# Patient Record
Sex: MALE | Race: WHITE | URBAN - METROPOLITAN AREA
[De-identification: names, ages, dates, MRNs, and addresses within clinical notes are randomized per-mention and may not be internally consistent; named-entity substitution may affect disease eponyms.]

---

## 2017-11-30 ENCOUNTER — ALLIED HEALTH/NURSE VISIT (OUTPATIENT)
Dept: NEUROLOGY | Facility: CLINIC | Age: 82
End: 2017-11-30

## 2017-11-30 ENCOUNTER — THERAPY VISIT (OUTPATIENT)
Dept: SPEECH THERAPY | Facility: CLINIC | Age: 82
End: 2017-11-30

## 2017-11-30 ENCOUNTER — RADIANT APPOINTMENT (OUTPATIENT)
Dept: CARDIOLOGY | Facility: CLINIC | Age: 82
End: 2017-11-30
Attending: PSYCHIATRY & NEUROLOGY

## 2017-11-30 ENCOUNTER — THERAPY VISIT (OUTPATIENT)
Dept: OCCUPATIONAL THERAPY | Facility: CLINIC | Age: 82
End: 2017-11-30

## 2017-11-30 ENCOUNTER — THERAPY VISIT (OUTPATIENT)
Dept: PHYSICAL THERAPY | Facility: CLINIC | Age: 82
End: 2017-11-30

## 2017-11-30 ENCOUNTER — OFFICE VISIT (OUTPATIENT)
Dept: NEUROLOGY | Facility: CLINIC | Age: 82
End: 2017-11-30

## 2017-11-30 VITALS
TEMPERATURE: 98.2 F | RESPIRATION RATE: 20 BRPM | HEART RATE: 62 BPM | OXYGEN SATURATION: 98 % | HEIGHT: 67 IN | WEIGHT: 153.4 LBS | SYSTOLIC BLOOD PRESSURE: 149 MMHG | BODY MASS INDEX: 24.08 KG/M2 | DIASTOLIC BLOOD PRESSURE: 67 MMHG

## 2017-11-30 DIAGNOSIS — R29.898 RIGHT LEG WEAKNESS: ICD-10-CM

## 2017-11-30 DIAGNOSIS — Z74.09 IMPAIRED MOBILITY AND ADLS: Primary | ICD-10-CM

## 2017-11-30 DIAGNOSIS — G12.1 KENNEDY'S DISEASE (H): ICD-10-CM

## 2017-11-30 DIAGNOSIS — G45.1 HEMISPHERIC CAROTID ARTERY SYNDROME: ICD-10-CM

## 2017-11-30 DIAGNOSIS — Z78.9 IMPAIRED MOBILITY AND ADLS: ICD-10-CM

## 2017-11-30 DIAGNOSIS — Z74.09 IMPAIRED MOBILITY: Primary | ICD-10-CM

## 2017-11-30 DIAGNOSIS — R29.5 TRANSIENT PARALYSIS: ICD-10-CM

## 2017-11-30 DIAGNOSIS — R13.12 OROPHARYNGEAL DYSPHAGIA: Primary | ICD-10-CM

## 2017-11-30 DIAGNOSIS — Z74.09 IMPAIRED MOBILITY AND ADLS: ICD-10-CM

## 2017-11-30 DIAGNOSIS — Z78.9 IMPAIRED MOBILITY AND ADLS: Primary | ICD-10-CM

## 2017-11-30 DIAGNOSIS — R79.9 ABNORMAL FINDING OF BLOOD CHEMISTRY: ICD-10-CM

## 2017-11-30 DIAGNOSIS — G12.1 KENNEDY'S DISEASE (H): Primary | ICD-10-CM

## 2017-11-30 DIAGNOSIS — R13.12 OROPHARYNGEAL DYSPHAGIA: ICD-10-CM

## 2017-11-30 DIAGNOSIS — C84.A0: Primary | ICD-10-CM

## 2017-11-30 RX ORDER — OXYBUTYNIN CHLORIDE 5 MG/1
5 TABLET ORAL 2 TIMES DAILY
COMMUNITY

## 2017-11-30 RX ORDER — BACTERIOSTATIC WATER 1 ML/ML
30 INJECTION, SOLUTION INTRAMUSCULAR; INTRAVENOUS; SUBCUTANEOUS ONCE
Status: COMPLETED | OUTPATIENT
Start: 2017-11-30 | End: 2017-11-30

## 2017-11-30 RX ORDER — TRIAMCINOLONE ACETONIDE 1 MG/G
1 OINTMENT TOPICAL DAILY
COMMUNITY
Start: 2015-07-17

## 2017-11-30 RX ORDER — CLOBETASOL PROPIONATE 0.5 MG/G
1 OINTMENT TOPICAL DAILY
COMMUNITY
Start: 2015-07-14

## 2017-11-30 RX ADMIN — Medication 3 ML: at 14:30

## 2017-11-30 RX ADMIN — BACTERIOSTATIC WATER 10 ML: 1 INJECTION, SOLUTION INTRAMUSCULAR; INTRAVENOUS; SUBCUTANEOUS at 14:28

## 2017-11-30 ASSESSMENT — REVISED AMYOTROPHIC LATERAL SCLEROSIS FUNCTIONAL RATING SCALE (ALSFRS-R)
DYSPNEA: 4
DRESSING_AND_HYGEINE: 2
SWALLOWING: EARLY EATING PROBLEMS - OCCASIONAL CHOKING
RESPIRATORY_SUBTOTAL_SCORE: 12
ALSFRS_TOTAL_SCORE: 39
SWALLOWING: 3
SIX_ITEM_SUBTOTAL: 19
CLIMBING_STAIRS: 1
HANDWRITING: 3
WALKING: EARLY AMBULATION DIFFICULTIES
CLIMBING_STAIRS: NEEDS ASSISTANCE
FINE_MOTOR_SUBTOTAL_SCORE: 9
SALIVATION: 4
SPEECH: DETECTABLE SPEECH DISTURBANCES
GROSS_MOTOR_SUBTOTAL_SCORE: 8
DRESSING_AND_HYGEINE: INTERMITTENT ASSISTANCE OR SUBSTITUTE METHODS
TURNING_IN_BED_AND_ADJUSTING_BED_CLOTHES: NORMAL
ORTHOPENA: 4
CUTTING_FOOD_AND_HANDLING_UTENSILES: 4
BULBAR_SUBTOTAL: 10
TURNING_IN_BED_AND_ADJUSTING_BED_CLOTHES: 4
RESPIRATORY_INSUFFICIENCY: 4
WALKING: 3
SPEECH: 3
SALIVATION: NORMAL
HANDWRITING: SLOW OR SLOPPY: ALL WORDS ARE LEGIBLE

## 2017-11-30 ASSESSMENT — ENCOUNTER SYMPTOMS
JOINT SWELLING: 0
DYSURIA: 1
HEMATURIA: 0
ARTHRALGIAS: 0

## 2017-11-30 ASSESSMENT — PAIN SCALES - GENERAL: PAINLEVEL: NO PAIN (1)

## 2017-11-30 NOTE — PROGRESS NOTES
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    NUTRITION HISTORY  Factors affecting nutrition intake include:swallowing difficulties  Current diet: general   Current appetite/intake: good  PEG Tube: No    Kuldeep avoids rice and quinoa d/t difficulty swallowing. Denies difficulty with any other foods.     Diet Recall  Breakfast Cereal with 1% milk   Lunch Hawaiian ham/cheese sandwich with can of cream soda (diet or regular)   Dinner Variety of meat and vegetables, potatoes 1 can cream soda   Snacks Ice cream, pie, cookies, nuts   Beverages Water, 2 cream sodas/day   Alcohol No       ANTHROPOMETRICS  Height: 66kg  Weight: 153 lbs/70kg  BMI: 24.6  Weight Status:  Normal BMI  IBW: 140 lbs  % IBW: 109%  Weight History: stable   Wt Readings from Last 5 Encounters:   11/30/17 69.6 kg (153 lb 6.4 oz)   05/08/14 68.5 kg (151 lb)   05/23/13 72.1 kg (159 lb)   09/20/12 69.4 kg (153 lb)   07/13/12 67.8 kg (149 lb 8 oz)     ASSESSED NUTRITION NEEDS:  Estimated Energy Needs: 4329-2132 kcals (25-30 Kcal/Kg)  Justification: maintenance  Estimated Protein Needs: 84 grams protein (1.2 g pro/Kg)  Justification: maintenance  Estimated Fluid Needs: 2000  mL   Justification: maintenance    MALNUTRITION:  % Weight Loss:  None noted  % Intake:  No decreased intake noted  Subcutaneous Fat Loss:  None observed  Muscle Loss:  None observed  Fluid Retention:  None noted    Malnutrition Diagnosis: Patient does not meet two of the above criteria necessary for diagnosing malnutrition but is at risk    NUTRITION DIAGNOSIS:  Predicted suboptimal nutrient intake related to related to Be's disease, dysphagia     INTERVENTIONS  Recommendations / Nutrition Prescription  1. 2100-2400kcal/day via small frequent meals  Nutrition Education    Provided written & verbal education on:    - Ways to maximize kcal and protein intake. Discussed calorie and protein needs for maintenance of weight and nutrition status.  Advised pt to aim for at least 2100kcal and 80g protein via 5-6 small frequent meals.   - Coping with barriers - as chemotherapy/radiation progresses, eating may become more difficult and discussed ways to cope with this.  - ONS (Ensure Plus/Boost Plus, CIB, Benecalorie, Scandi shake etc). Suggested ways to incorporate these supplements to avoid flavor fatigue. Encouraged pt to start consuming 1-2 ONS daily if weight starts to decline      Pt verbalize understanding of materials provided during consult.   Patient Understanding: Excellent  Expected Compliance: Excellent     Goals  1.  Aim for 5-6 small frequent meals  2.  Aim for 2100kcal and 80g protein/day  3. Weight maintenance       Follow-Up Plans: Pt has RD contact information for questions.      MONITORING AND EVALUATION:  -Food intake  -Weight trends    Ksenia Garcia RD, LD

## 2017-11-30 NOTE — PATIENT INSTRUCTIONS
"Dr Gonzalez has ordered an MRI/MRA for you today.  You will also need an EKG/Echo today.    You will need to set up a lab appointment. It is a \"fasting lab\" meaning you need to have gone 8 hrs w/out eating before your lab appt.    yr follow up with Dr Gonzalez.  "

## 2017-11-30 NOTE — PROGRESS NOTES
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    Others at Clinic Visit:  Alone    Living Situation:   With others  Daughter    Patient Concerns/Goals:  Increased swallowing difficulty  Increased speech deficits    Observations: Patient pleasant and cooperative. He reports increased difficulty swallowing, specifically with large sips of liquids and foods such as rice, nuts, and corn.    Current Mode of Nutrition:   Oral diet    Weight: 153lbs    Cardio-Respiratory Status: Forced vital capacity: 101%    Functional Rating Scale (ALS-FRS): 39/48    Oral Motor/Swallowing  Oral Motor Function:  Anomalies present:  Labial anomaly- mildly reduced strength and ROM, right greater than left  Velar elevation- hypernasal suggests some deficits, palate appears minorly assymetrical at rest but elevates symetrically    Volitional Abilities:  Cough- Functional  Throat Clear- Functional  Swallow- Present    Feeding Assistance: No assistance needed     EAT10: 6 with 1 point for each of the following; extra effort with liquids, extra effort with pills, pleasure of eating is affected, food sticks in throat, coughing with eating, swallowing is stressful.    Swallow Function:   Thin Liquid-  mildly delayed swallow initiation. No overt oral dysphagia or overt s/s aspiration  Solid-  mildly delayed swallow initiation and reduced laryngeal elevation bu adequate oral phase.    Dysphagia Diagnosis: Mild dysphagia    Speech Intelligibility/Functional Communication   Methods of communication: Verbal    Dysarthria: Minimal    Speech:   Deficits in resonance- Hypernasal    Speech Intelligibility/Communication:  Communicates  functionally    Augmentative and Alternative Communication (AAC):   Not indicated    Clinical Impression/Plan of Care  Treatment Diagnosis: Oropharyngeal Dysphagia     Recommendations:  Oral diet.  Soft, moist solid with no particulate.  Continue use of compensatory strategies.    Plan of Care:  1 session evaluation and treatment.    Goals: Based on today's evaluation session patient and/or caregiver will have understanding of current communication and/or swallowing status and recommendations for management.    Educational Assessment:  Learners- Patient  Barriers to Learning- No barriers.    Education provided/response:   Speech  Swallowing  Diet  Verbalized understanding    Treatment provided this date:   Swallow intervention, 20 minutes minutes    Response to recommendations/treatment: SLP provided education re swallowing, swallowing anatomy and physiology, aspiration risks, diet modifications and compensatory strategies. Recommend he continue regular solids and thin liquids but with close monitoring for tolerance and encouraged soft, moist solids. Provided education and handout re NDD3 for ideas. Encouraged alternating consistencies, small bites/sips, slow rate, chin tuck, and avoiding straws. Pt also reports some difficulty with pills, educated re pills in pureed. Educated re possibility of Video Swallow Study to more clearly assess. He wishes to trial eating with these recommendations and if ongoing issues are noted he will seek VFSS.     Goal attainment: All goals met    Risks and benefits of evaluation/treatment have been explained.  Patient, family and/or caregiver are in agreement with Plan of Care.    Timed Code Treatment Minutes: 0 minutes    Total Treatment Time (sum of timed and untimed services): 35 minutes    Medicare G-code:  Swallowing  Swallowing: Current Status , Goal , Discharge   1 session only, modifier the same for all G-codes.  CI: 1-19% impairment  Modifier determined by  clinical judgment in conjunction with objective data and subjective report.

## 2017-11-30 NOTE — LETTER
"11/30/2017       RE: Paul Allen Franke  [address]  [place] [address]     Dear Colleague,    Thank you for referring your patient, Paul Allen Franke, to the [facility] NEUROLOGY at [facility]. Please see a copy of my visit note below.    NEUROLOGY  CLINIC      History of Present Illness:  Mr. 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  He was last seen in May of 2014.  At that time he was continuing to work full time at [employer] despite some fatigue, having cramps that were well controlled with gabapentin, and occasional dysphagia with stable weakness.      He notes progressive worsening of symptoms since then.  In Feb 2017 he was moving boxes and trying to lift the boxes and felt an \"electrical charge\" in right quad and sudden onset of leg weakness leading to a fall.  Had onset of back pain at that same time as well. Tried to continue working and fell an additional 5 times.  Injured left foot during one of the falls and was seen by PCP and had negative XRs at that time.  Did not have any imaging of back or brain at that time.  Feels like leg strength has been significantly worse since then, primarily on that side but feels like left leg gets more fatigued as well due to compensating.  Continues to have some back pain as well. Can walk normally in AM but right leg gets tired more easily.  Denies numbness or tingling.  Right leg feels \"clumsy\" and \"heavy\".  No radicular pain down the leg. No falls besides the one in Feb 2017 but knows that he needs to be cautious with how he moves.  Arms have also gotten weaker over time which he mostly notices when lifting 35+ lbs of food at work but is able to lift up to 30 lbs without problems.    Continues to work 40 hours a week at [employer].  Not interested in cutting back on hours at this point out of \"economic necessity\".  Continues to have cramps in legs, primarily " "at night, that respond well to stretching and gabapentin.  Feels happy with control of cramps at this point.  Dysphagia has progressed a little bit- has trouble with rice in particular now and occasionally liquids.  He is chewing foods slowly and carefully and has coughing/choking episodes about once a week if he takes his time.  Not thickening liquids.   Has not lost weight.  Is wondering if handicap parking sign is appropriate and what prognosis is.        Current Medications:  Current Outpatient Prescriptions   Medication Sig Dispense Refill     gabapentin (NEURONTIN) 100 MG capsule Take 2 capsules (200 mg) by mouth 3 times daily 180 capsule 5     diflorasone (PSORCON) 0.05 % ointment Apply to affected area(s) twice daily 180 g 0     tamsulosin (FLOMAX) 0.4 MG 24 hr capsule Take by mouth daily       lisinopril (PRINIVIL,ZESTRIL) 2.5 MG tablet Take 2.5 mg by mouth daily.       aspirin 81 MG tablet Take 1 tablet by mouth daily.       lovastatin (MEVACOR) 40 MG tablet Take 2 tablets by mouth At Bedtime.       nitroGLYCERIN (NITROSTAT) 0.4 MG SL tablet Place 0.4 mg under the tongue every 5 minutes as needed.       metoprolol (TOPROL XL) 50 MG 24 hr tablet Take 50 mg by mouth daily.         Physical Exam:  Blood pressure 149/67, pulse 62, temperature 98.2  F (36.8  C), temperature source Oral, resp. rate 20, height 1.689 m (5' 6.5\"), weight 69.6 kg (153 lb 6.4 oz), SpO2 98 %.   He has normal extraocular muscles, eyelid closure is weaker on the right compared to left.  He has moderate to severe weakness of lip closure on the right side and mild to moderate on the left.  It is asymmetric.  Tongue shows mild fasciculations and modest atrophy.  Jaw opening and closure is mildly weak.   Deltoid  strength is 5 bilaterally, biceps are 4+ bilaterally, triceps 4 bilaterally.  Wrist extension is 5, bilateral FDIs 4, APB 5 on the right and 4 on the left,  5 on the right and 4 on the left.   Hip flexion is 4+ bilaterally, " quads are 5, hamstrings 4 on the right and 4+ on the left, foot dorsiflexion 5, and hip adductors are 4+ on the right and 5- on the left.  Tone is mildly increased on the right leg with a spastic catch. Gait shows circumduction gait with right foot drop.  Reflexes are 0 at bilateral patella and ankles. Plantar responses were bilaterally neutral. Sensory exam shows equal perception of light touch and pinprick in both legs without obvious asymmetry.       Assessment and Plan:  Patient is a 83 yo male with a history of genetically confirmed Be's diseases who presents for follow-up.  In terms of his Be's disease, his strength is overall stable compared to 2014.  Has had some progression of dysphagia and we will have him see SLP for potential diet modification and PT today.    The more pressing issue, however, is the acute change in his right leg strength and gait that occurred in Feb 2017.  This sudden change cannot be explained by his Be's disease.  Given his circumducted gait pattern and increased tone on exam, I am most concerned about the possibility of an ischemic stroke.  We will have him undergo stroke work-up, including MRI/MRA of the brain and neck, fasting lipids, A1c, EKG, and Echo.  He was instructed to continue his daily aspirin.  If stroke work-up is negative, then will consider imaging the lumbar spine, but radiculopathy is less likely given clinical exam.    Return to clinic in 1 year.    Sharri Becker  PM&R resident    Patient seen with Dr. Gonzalez.    ATTENDING ADDENDUM: Patient seen and examined today with resident Dr Becker at the [facility]. I agree with her assessment and recs as above. 40 minutes spent in direct patient interaction, of which greater than 50% in counseling and coordination of care. Alban Gonzalez MD      Again, thank you for allowing me to participate in the care of your patient.       Sincerely,    Alban Gonzalez MD

## 2017-11-30 NOTE — PROGRESS NOTES
"    OUTPATIENT PHYSICAL THERAPY CLINIC NOTE  Franke,Paul Allen  YOB: 1935  [id number]    Type of visit:         Evaluation     Date of service: 11/30/2017    Referring provider: Dr. Gonzalez    Others present at visit:  none    Medical diagnosis:   Be's disease    Date of diagnosis: 1994    Pertinent history of current problem (include personal factors and/or comorbidities that impact the plan of care): Gradual weakness over many years but recent onset of R LE incoordination with 6 falls in Feb 2017.    Cardio-respiratory status:  Forced vital capacity: 101 % of predicted     Height/Weight: 5'6.5\" / 153 lb    Living environment:  Apartment with daughter    Living environment barriers:  1 stairs to enter (no railing present), to sidewalk out front, able to hold onto stone work      Current assistance/living environment:  Lives with daughter (she doesn't work, on disability)      Current mobility equipment:  Standard cane(s)- has in home from daughter's prior use, but not using     Current ADL equipment:  Tub/shower combo  Shower chair  Wall grab bar    Technology used: NT    Patient concerns/goals: Had a spell in February with R LE not working well, has \"lost mobility\" with R LE. Didn't go in to get checked out. Working full time in grocery store, walks better with support of cart.     Evaluation   Interview completed.   Pain assessment: Low back when walking today- woke with back hurting today for first time in very long time. 7-8/10. Thinks it's due to compensating d/t R LE issue.    Range of motion: Stiffness HS R>L   Manual muscle testing: Hip flexion 4+/5 R, 5/5 L, hip abd 4+/5 R, 5/5 L, hip add 5/5 B, knee ext 4+/5 R, 5/5 L, knee flex 5/5 B, ankle DF 5/5 B   Gait: Pt amb without AD with waddling gait, significant torso lean to R in R stance with low back pain.    Cognition: Intact     *ALS FRS-R (ALS Functional Rating Scale-Revised) completed today. Please see separate note. Total score: " 39    Fall Risk Screen:   Has the patient fallen 2 or more times in the last year? Yes- fell 6 times in February.       Has the patient fallen and had an injury in the past year? Yes, fell onto L foot, very swollen 3 weeks       Timed Up and Go Score: 12.59 sec, no AD     25ft walk: No AD: 12.06 sec, 17 steps; 4ww trial: 12.88 sec, 18 steps (new device)    Is the patient a fall risk? Yes, department fall risk interventions implemented     Impairments:  Fatigue  Coordination  Balance  Pain  Range of motion     Treatment diagnosis:  Impaired mobility  Impaired activities of daily living    Clinical Presentation: Evolving/Changing  Clinical Presentation Rationale: Gradually progressive nature of Be's with recent onset of R LE incoordination- possible CVA, will undergo further workup.  Clinical Decision Making (Complexity): Moderate complexity     Recommendations/Plan of care:  1 session evaluation & treatment.  Equipment recommended: Cane- has available will check height, and consider 4ww- would like to think about this.  Referrals recommended: Encouraged pt consider OP PT to address low back pain and safe mobility. Pt would like to await further medical workup and consider this later.      Goals:   Target date: 11/30/17  Patient, family and/or caregiver will verbalize understanding of evaluation results and implications for functional performance.  Patient, family and/or caregiver will verbalize/demonstrate understanding of compensatory methods /equipment to enhance functional independence and safety.    Educational assessment/barriers to learning:   No barriers noted     Treatment provided this date:   Gait training, 15 minutes  -Trial of 4ww and SEC, with pt open to use of SEC but not ready to consider 4ww at this time, per report. Pt did note liking support of 4ww similar to shopping cart, but more open to the idea of a cane. Education provided for use and sequence of cane, encouraged use in L hand/opposite of  MAURY NAVARRO. This did reduce the torso lean and was more stable than without AD. I did give pt feedback that I preferred the support of the 4ww with notable improvement in stability, pt voiced understanding and will think about it. Also discussed benefits of seat for available rest. Pt open to use of cane, has cane at home. Education for how to size the cane for his height.     Response to treatment/recommendations: Verbalizes understanding    Goal attainment:  All goals met     Risks and benefits of evaluation/treatment have been explained.  Patient, family and/or caregiver are in agreement with Plan of Care.     Timed Code Treatment Minutes: 15  Total Treatment Time (sum of timed and untimed services): 31    Signature:   Thais Soliz, PT, DPT  Physical Therapist  [facility]  Outpatient Rehabilitation Services, [address]  [place] [address]  [email or web address]  Office: [phone]  Pager: [phone]  Fax: [phone]   Date: 11/30/2017    Medicare G-code:  Mobility: Walking and Moving Around  Current Status , Goal , Discharge   1 session only, modifier the same for all G-codes.  CK: 40-59% impairment  Modifier determined by clinical judgment in conjunction with objective data and subjective report.

## 2017-11-30 NOTE — MR AVS SNAPSHOT
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 Please bring or wear a comfortable two-piece outfit. 2. You may eat, drink and take your normal medicines. 3. For any questions that cannot be answered, please contact the ordering physician            Dec 12, 2017 10:30 AM CST   LAB with  LAB   [facility] ([facility])    [address] [place] [id number] [phone]           Please do not eat 10-12 hours before your appointment if you are coming in fasting for labs on lipids, cholesterol, or glucose (sugar). This does not apply to pregnant women. Water, hot tea and black coffee (with nothing added) are okay. Do not drink other fluids, diet soda or chew gum.            Dec 12, 2017 10:45 AM CST   (Arrive by 10:30 AM)   MR BRAIN FOR STROKE COMPLETE with [address] [facility] MRI ([facility])    [address] [place] [id number] [phone]           Take your medicines as usual, unless your doctor tells you not to. Bring a list of your current medicines to your exam (including vitamins, minerals and over-the-counter drugs). Also bring the results of similar scans you may have had.  Please remove any body piercings and hair extensions before you arrive.  Follow your doctor s orders. If you do  not, we may have to postpone your exam.  You will not have contrast for this exam. You do not need to do anything special to prepare.  The MRI machine uses a strong magnet. Please wear clothes without metal (snaps, zippers). A sweatsuit works well, or we may give you a hospital gown.   **IMPORTANT** THE INSTRUCTIONS BELOW ARE ONLY FOR THOSE PATIENTS WHO HAVE BEEN TOLD THEY WILL RECEIVE SEDATION OR GENERAL ANESTHESIA DURING THEIR MRI PROCEDURE:  IF YOU WILL RECEIVE SEDATION (take medicine to help you relax during your exam):   You must get the medicine from your doctor before you arrive. Bring the medicine to the exam. Do not take it at home.   Arrive one hour early. Bring someone who can take you home after the test. Your medicine will make you sleepy. After the exam, you may not drive, take a bus or take a taxi by yourself.   No eating 8 hours before your exam. You may have clear liquids up until 4 hours before your exam. (Clear liquids include water, clear tea, black coffee and fruit juice without pulp.)  IF YOU WILL RECEIVE ANESTHESIA (be asleep for your exam):   Arrive 1 1/2 hours early. Bring someone who can take you home after the test. You may not drive, take a bus or take a taxi by yourself.   No eating 8 hours before your exam. You may have clear liquids up until 4 hours before your exam. (Clear liquids include water, clear tea, black coffee and fruit juice without pulp.)   You will spend four to five hours in the recovery room.  Please call the Imaging Department at your exam site with any questions.            Nov 15, 2018  9:30 AM CST   PFT VISIT with  PFL EDINSON   [facility] Pulmonary Function Testing ([facility])    [address] [place] [id number] [phone]            Nov 15, 2018 10:00 AM CST   (Arrive by 9:45 AM)   New ALS/Motor Neuron with Alban Gonzalez MD   [facility] Neurology ([facility])    [address] [place] [address]  Street   [address] [facility] [address] [phone]              Future tests that were ordered for you today     Open Future Orders        Priority Expected Expires Ordered    EKG 12-lead complete w/read - Clinics Routine  2018    Lipid panel Routine  2018    Hemoglobin A1c Routine  2018    Echocardiogram Routine  2018    MRI Brain for stroke complete Routine  2018            Who to contact     Please call your clinic at [phone] to:    Ask questions about your health    Make or cancel appointments    Discuss your medicines    Learn about your test results    Speak to your doctor   If you have compliments or concerns about an experience at your clinic, or if you wish to file a complaint, please contact [facility] Patient Relations at [phone] or email us at [email or web address]         Additional Information About Your Visit        [email or web address] Information     YourTeamOnline is an electronic gateway that provides easy, online access to your medical records. With YourTeamOnline, you can request a clinic appointment, read your test results, renew a prescription or communicate with your care team.     To sign up for YourTeamOnline visit the website at [email or web address]   You will be asked to enter the access code listed below, as well as some personal information. Please follow the directions to create your username and password.     Your access code is: [email or web address]  Expires: 2018  6:31 AM     Your access code will  in 90 days. If you need help or a new code, please contact your [facility] or call [phone] for assistance.        Care EveryWhere ID     This is your Care EveryWhere ID. This could be used by other organizations to access your [place] medical records  [id number]         Blood Pressure from Last 3 Encounters:   17 149/67    05/08/14 123/53   05/23/13 125/74    Weight from Last 3 Encounters:   11/30/17 69.6 kg (153 lb 6.4 oz)   05/08/14 68.5 kg (151 lb)   05/23/13 72.1 kg (159 lb)              We Performed the Following     PHYSICAL THERAPY REFERRAL        Primary Care Provider Office Phone # Fax #    Casper Ortega [phone]        [facility] [address] [facility] [address]        Equal Access to Services     ALEJANDRA ROMERO : Hadii aad ku hadasho Soomaali, waaxda luqadaha, qaybta kaalmada adeegyada, waxay idiin hayaan yossi huerta . So [facility] [phone].    ATENCIÓN: Si habla español, tiene a silva disposición servicios gratuitos de asistencia lingüística. [facility] [phone].    We comply with applicable federal civil rights laws and [place] laws. We do not discriminate on the basis of race, color, national origin, age, disability, sex, sexual orientation, or gender identity.            Thank you!     Thank you for choosing [facility] PHYSICAL THERAPY AND REHAB  for your care. Our goal is always to provide you with excellent care. Hearing back from our patients is one way we can continue to improve our services. Please take a few minutes to complete the written survey that you may receive in the mail after your visit with us. Thank you!             Your Updated Medication List - Protect others around you: Learn how to safely use, store and throw away your medicines at www.disposemymeds.org.          This list is accurate as of: 11/30/17  1:07 PM.  Always use your most recent med list.                   Brand Name Dispense Instructions for use Diagnosis    aspirin 81 MG tablet      Take 1 tablet by mouth daily.    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        lovastatin 40 MG tablet    MEVACOR     Take 2 tablets by mouth At Bedtime.    Cutaneous T-cell lymphoma (H)       nitroGLYcerin 0.4 MG sublingual tablet    NITROSTAT     Place 0.4 mg under the tongue every 5 minutes as needed.    Cutaneous T-cell lymphoma (H)       oxybutynin 5 MG tablet    DITROPAN     Take 5 mg by mouth 2 times daily        tamsulosin 0.4 MG capsule    FLOMAX     Take by mouth daily        TOPROL XL 50 MG 24 hr tablet   Generic drug:  metoprolol      Take 50 mg by mouth daily.    Cutaneous T-cell lymphoma (H)       triamcinolone 0.1 % ointment    KENALOG     Apply 1 Application topically daily

## 2017-11-30 NOTE — MR AVS SNAPSHOT
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)    [address] [place] [id number] [phone]           Please do not eat 10-12 hours before your appointment if you are coming in fasting for labs on lipids, cholesterol, or glucose (sugar). This does not apply to pregnant women. Water, hot tea and black coffee (with nothing added) are okay. Do not drink other fluids, diet soda or chew gum.            Dec 22, 2017  1:45 PM CST   (Arrive by 1:30 PM)   MR BRAIN FOR STROKE COMPLETE with [address] [facility] ([facility])    [address] [place] [id number] [phone]           Take your medicines as usual, unless your doctor tells you not to. Bring a list of your current medicines to your exam (including vitamins, minerals and over-the-counter drugs). Also bring the results of similar scans you may have had.  Please remove any body piercings and hair extensions before you arrive.  Follow your doctor s orders. If you do not, we may have to postpone your exam.  You will not have contrast for this exam. You do not need to do anything special to prepare.  The MRI machine uses a strong magnet. Please wear clothes without metal (snaps, zippers). A sweatsuit works well, or we may give you a hospital gown.   **IMPORTANT** THE INSTRUCTIONS BELOW ARE ONLY FOR THOSE PATIENTS WHO HAVE BEEN TOLD THEY  WILL RECEIVE SEDATION OR GENERAL ANESTHESIA DURING THEIR MRI PROCEDURE:  IF YOU WILL RECEIVE SEDATION (take medicine to help you relax during your exam):   You must get the medicine from your doctor before you arrive. Bring the medicine to the exam. Do not take it at home.   Arrive one hour early. Bring someone who can take you home after the test. Your medicine will make you sleepy. After the exam, you may not drive, take a bus or take a taxi by yourself.   No eating 8 hours before your exam. You may have clear liquids up until 4 hours before your exam. (Clear liquids include water, clear tea, black coffee and fruit juice without pulp.)  IF YOU WILL RECEIVE ANESTHESIA (be asleep for your exam):   Arrive 1 1/2 hours early. Bring someone who can take you home after the test. You may not drive, take a bus or take a taxi by yourself.   No eating 8 hours before your exam. You may have clear liquids up until 4 hours before your exam. (Clear liquids include water, clear tea, black coffee and fruit juice without pulp.)   You will spend four to five hours in the recovery room.  Please call the Imaging Department at your exam site with any questions.            Nov 15, 2018  9:30 AM CST   PFT VISIT with  PFL B   [facility] Pulmonary Function Testing ([facility])    [address] [place] [address] [phone]            Nov 15, 2018 10:00 AM CST   (Arrive by 9:45 AM)   New ALS/Motor Neuron with Alban Gonzalez MD   [facility] Neurology ([facility])    [address] [place] [address] [phone]              Who to contact     Please call your clinic at [phone] to:    Ask questions about your health    Make or cancel appointments    Discuss your medicines    Learn about your test results    Speak to your doctor   If you have compliments or concerns about an experience at your clinic, or if you wish to  file a complaint, please contact [facility] Patient Relations at [phone] or email us at [email or web address]         Additional Information About Your Visit        [employer] Information     T[employer] Skoops is an electronic gateway that provides easy, online access to your medical records. With [employer] Sko[employer], you can request a clinic appointment, read your test results, renew a prescription or communicate with your care team.     To sign up for [employer] Skoops visit the website at [email or web address]   You will be asked to enter the access code listed below, as well as some personal information. Please follow the directions to create your username and password.     Your access code is: [email or web address]  Expires: 2018  6:31 AM     Your access code will  in 90 days. If you need help or a new code, please contact your [facility] or call [phone] for assistance.        Care EveryWhere ID     This is your Care EveryWhere ID. This could be used by other organizations to access your [place] medical records  [id number]         Blood Pressure from Last 3 Encounters:   17 149/67   14 123/53   13 125/74    Weight from Last 3 Encounters:   17 69.6 kg (153 lb 6.4 oz)   14 68.5 kg (151 lb)   13 72.1 kg (159 lb)              We Performed the Following     OCCUPATIONAL THERAPY REFERRAL        Primary Care Provider Office Phone # Fax #    Casper LORENZANA Jordan [phone]        [facility] [address] [facility] [address]        Equal Access to Services     ALEJANDRA ROMERO : Hadii aad ku hadasho Soomaali, waaxda luqadaha, qaybta kaalmada adeegyada, matias curry. So [facility] [phone].    ATENCIÓN: Si habla español, tiene a silva disposición servicios gratuitos de asistencia lingüística. Llame al [phone].    We comply with applicable federal civil rights laws and [place] laws. We  do not discriminate on the basis of race, color, national origin, age, disability, sex, sexual orientation, or gender identity.            Thank you!     Thank you for choosing [facility] OCCUPATIONAL THERAPY AND REHAB  for your care. Our goal is always to provide you with excellent care. Hearing back from our patients is one way we can continue to improve our services. Please take a few minutes to complete the written survey that you may receive in the mail after your visit with us. Thank you!             Your Updated Medication List - Protect others around you: Learn how to safely use, store and throw away your medicines at www.disposemymeds.org.          This list is accurate as of: 11/30/17 11:59 PM.  Always use your most recent med list.                   Brand Name Dispense Instructions for use Diagnosis    aspirin 81 MG tablet      Take 1 tablet by mouth daily.    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        lovastatin 40 MG tablet    MEVACOR     Take 2 tablets by mouth At Bedtime.    Cutaneous T-cell lymphoma (H)       nitroGLYcerin 0.4 MG sublingual tablet    NITROSTAT     Place 0.4 mg under the tongue every 5 minutes as needed.    Cutaneous T-cell lymphoma (H)       oxybutynin 5 MG tablet    DITROPAN     Take 5 mg by mouth 2 times daily        tamsulosin 0.4 MG capsule    FLOMAX     Take by mouth daily        TOPROL XL 50 MG 24 hr tablet   Generic drug:  metoprolol      Take 50 mg by mouth daily.    Cutaneous T-cell lymphoma (H)       triamcinolone 0.1 % ointment    KENALOG      Apply 1 Application topically daily

## 2017-11-30 NOTE — MR AVS SNAPSHOT
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  You will also need an EKG/Echo today.    You will need to set up a lab appointment. It is a \"fasting lab\" meaning you need to have gone 8 hrs w/out eating before your lab appt.    yr follow up with Dr Gonzalez.          Follow-ups after your visit        Additional Services     OCCUPATIONAL THERAPY REFERRAL       OT Clinician to evaluate and treat patient in ALS Clinic.            PHYSICAL THERAPY REFERRAL       PT Clinician to evaluate and treat patient in ALS Clinic.            SPEECH THERAPY REFERRAL       Speech Language Pathologist to evaluate and treat patient in ALS Clinic.                  Your next 10 appointments already scheduled     Nov 30, 2017  2:00 PM CST   Ech Complete with UCECHCR4   [facility] Echo ([facility])    [address] [place] [address] [phone]           1. Please bring or wear a comfortable two-piece outfit. 2. You may eat, drink and take your normal medicines. 3. For any questions that cannot be answered, please contact the ordering physician            Dec 12, 2017 10:30 AM CST   LAB with [facility] ([facility])    [address] [place] [address] [phone]           Please do not eat 10-12 hours before your appointment if you are coming in fasting for labs on lipids, " cholesterol, or glucose (sugar). This does not apply to pregnant women. Water, hot tea and black coffee (with nothing added) are okay. Do not drink other fluids, diet soda or chew gum.            Dec 12, 2017 10:45 AM CST   (Arrive by 10:30 AM)   MR BRAIN FOR STROKE COMPLETE with UC1   [facility] MRI ([facility])    [address]  [facility] [address] [phone]           Take your medicines as usual, unless your doctor tells you not to. Bring a list of your current medicines to your exam (including vitamins, minerals and over-the-counter drugs). Also bring the results of similar scans you may have had.  Please remove any body piercings and hair extensions before you arrive.  Follow your doctor s orders. If you do not, we may have to postpone your exam.  You will not have contrast for this exam. You do not need to do anything special to prepare.  The MRI machine uses a strong magnet. Please wear clothes without metal (snaps, zippers). A sweatsuit works well, or we may give you a hospital gown.   **IMPORTANT** THE INSTRUCTIONS BELOW ARE ONLY FOR THOSE PATIENTS WHO HAVE BEEN TOLD THEY WILL RECEIVE SEDATION OR GENERAL ANESTHESIA DURING THEIR MRI PROCEDURE:  IF YOU WILL RECEIVE SEDATION (take medicine to help you relax during your exam):   You must get the medicine from your doctor before you arrive. Bring the medicine to the exam. Do not take it at home.   Arrive one hour early. Bring someone who can take you home after the test. Your medicine will make you sleepy. After the exam, you may not drive, take a bus or take a taxi by yourself.   No eating 8 hours before your exam. You may have clear liquids up until 4 hours before your exam. (Clear liquids include water, clear tea, black coffee and fruit juice without pulp.)  IF YOU WILL RECEIVE ANESTHESIA (be asleep for your exam):   Arrive 1 1/2 hours early. Bring someone who can take you home after the  test. You may not drive, take a bus or take a taxi by yourself.   No eating 8 hours before your exam. You may have clear liquids up until 4 hours before your exam. (Clear liquids include water, clear tea, black coffee and fruit juice without pulp.)   You will spend four to five hours in the recovery room.  Please call the Imaging Department at your exam site with any questions.            Nov 15, 2018  9:30 AM CST   PFT VISIT with  IDALIA NEVES   [facility] Pulmonary Function Testing ([facility])    [address] [place] [address] [phone]            Nov 15, 2018 10:00 AM CST   (Arrive by 9:45 AM)   New ALS/Motor Neuron with Alban Gonzalez MD   [facility] Neurology ([facility])    [address] [place] [address] [phone]              Future tests that were ordered for you today     Open Future Orders        Priority Expected Expires Ordered    EKG 12-lead complete w/read - Clinics Routine  11/30/2018 11/30/2017    Lipid panel Routine  11/30/2018 11/30/2017    Hemoglobin A1c Routine  11/30/2018 11/30/2017    MRI Brain for stroke complete Routine  11/30/2018 11/30/2017            Who to contact     Please call your clinic at [phone] to:    Ask questions about your health    Make or cancel appointments    Discuss your medicines    Learn about your test results    Speak to your doctor   If you have compliments or concerns about an experience at your clinic, or if you wish to file a complaint, please contact [facility] Patient Relations at [phone] or email us at [email or web address]         Additional Information About Your Visit        RUNformhart Information     MoBank is an electronic gateway that provides easy, online access to your medical records. With MoBank, you can request a clinic appointment, read your test results, renew a prescription or  "communicate with your care team.     To sign up for Medityplushart visit the website at [email or web address]   You will be asked to enter the access code listed below, as well as some personal information. Please follow the directions to create your username and password.     Your access code is: [email or web address]  Expires: 2018  6:31 AM     Your access code will  in 90 days. If you need help or a new code, please contact your [facility] or call [phone] for assistance.        Care EveryWhere ID     This is your Care EveryWhere ID. This could be used by other organizations to access your [place] medical records  [id number]        Your Vitals Were     Pulse Temperature Respirations Height Pulse Oximetry BMI (Body Mass Index)    62 98.2  F (36.8  C) (Oral) 20 1.689 m (5' 6.5\") 98% 24.39 kg/m2       Blood Pressure from Last 3 Encounters:   17 149/67   14 123/53   13 125/74    Weight from Last 3 Encounters:   17 69.6 kg (153 lb 6.4 oz)   14 68.5 kg (151 lb)   13 72.1 kg (159 lb)               Primary Care Provider Office Phone # Fax #    Casper Ortega [phone]        [facility] [address]        Equal Access to Services     ALEJANDRA ROMERO AH: Hadii tarah moraleso Sobrionna, waaxda luqadaha, qaybta kaalmada carlitos, matias curry. So [facility] [phone].    ATENCIÓN: Si habla español, tiene a silva disposición servicios gratuitos de asistencia lingüística. Llame al [phone].    We comply with applicable federal civil rights laws and [place] laws. We do not discriminate on the basis of race, color, national origin, age, disability, sex, sexual orientation, or gender identity.            Thank you!     Thank you for choosing [facility] NEUROLOGY  for your care. Our goal is always to provide you with excellent care. Hearing back from our patients is one way we can " continue to improve our services. Please take a few minutes to complete the written survey that you may receive in the mail after your visit with us. Thank you!             Your Updated Medication List - Protect others around you: Learn how to safely use, store and throw away your medicines at www.disposemymeds.org.          This list is accurate as of: 11/30/17  1:25 PM.  Always use your most recent med list.                   Brand Name Dispense Instructions for use Diagnosis    aspirin 81 MG tablet      Take 1 tablet by mouth daily.    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        lovastatin 40 MG tablet    MEVACOR     Take 2 tablets by mouth At Bedtime.    Cutaneous T-cell lymphoma (H)       nitroGLYcerin 0.4 MG sublingual tablet    NITROSTAT     Place 0.4 mg under the tongue every 5 minutes as needed.    Cutaneous T-cell lymphoma (H)       oxybutynin 5 MG tablet    DITROPAN     Take 5 mg by mouth 2 times daily        tamsulosin 0.4 MG capsule    FLOMAX     Take by mouth daily        TOPROL XL 50 MG 24 hr tablet   Generic drug:  metoprolol      Take 50 mg by mouth daily.    Cutaneous T-cell lymphoma (H)       triamcinolone 0.1 % ointment    KENALOG     Apply 1 Application topically daily

## 2017-11-30 NOTE — MR AVS SNAPSHOT
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 Please bring or wear a comfortable two-piece outfit. 2. You may eat, drink and take your normal medicines. 3. For any questions that cannot be answered, please contact the ordering physician            Dec 12, 2017 10:30 AM CST   LAB with  LAB   [facility] Lab ([facility])    [address] [place] [id number] [phone]           Please do not eat 10-12 hours before your appointment if you are coming in fasting for labs on lipids, cholesterol, or glucose (sugar). This does not apply to pregnant women. Water, hot tea and black coffee (with nothing added) are okay. Do not drink other fluids, diet soda or chew gum.            Dec 12, 2017 10:45 AM CST   (Arrive by 10:30 AM)   MR BRAIN FOR STROKE COMPLETE with [address] [facility] MRI ([facility])    [address] [place] [id number] [phone]           Take your medicines as usual, unless your doctor tells you not to. Bring a list of your current medicines to your exam (including vitamins, minerals and over-the-counter drugs). Also bring the results of similar scans you may have had.  Please remove any body piercings and hair extensions before you arrive.  Follow your doctor s orders. If you do not, we  may have to postpone your exam.  You will not have contrast for this exam. You do not need to do anything special to prepare.  The MRI machine uses a strong magnet. Please wear clothes without metal (snaps, zippers). A sweatsuit works well, or we may give you a hospital gown.   **IMPORTANT** THE INSTRUCTIONS BELOW ARE ONLY FOR THOSE PATIENTS WHO HAVE BEEN TOLD THEY WILL RECEIVE SEDATION OR GENERAL ANESTHESIA DURING THEIR MRI PROCEDURE:  IF YOU WILL RECEIVE SEDATION (take medicine to help you relax during your exam):   You must get the medicine from your doctor before you arrive. Bring the medicine to the exam. Do not take it at home.   Arrive one hour early. Bring someone who can take you home after the test. Your medicine will make you sleepy. After the exam, you may not drive, take a bus or take a taxi by yourself.   No eating 8 hours before your exam. You may have clear liquids up until 4 hours before your exam. (Clear liquids include water, clear tea, black coffee and fruit juice without pulp.)  IF YOU WILL RECEIVE ANESTHESIA (be asleep for your exam):   Arrive 1 1/2 hours early. Bring someone who can take you home after the test. You may not drive, take a bus or take a taxi by yourself.   No eating 8 hours before your exam. You may have clear liquids up until 4 hours before your exam. (Clear liquids include water, clear tea, black coffee and fruit juice without pulp.)   You will spend four to five hours in the recovery room.  Please call the Imaging Department at your exam site with any questions.            Nov 15, 2018  9:30 AM CST   PFT VISIT with  PFL EDINSON   [facility] Pulmonary Function Testing ([facility])    [address] [place] [address]  3rd [facility] [address] [phone]            Nov 15, 2018 10:00 AM CST   (Arrive by 9:45 AM)   New ALS/Motor Neuron with Alban Gonzalez MD   [facility] Neurology ([facility])    [address] [place] [address]  Se  3rd Floor  [facility] [address] [phone]              Future tests that were ordered for you today     Open Future Orders        Priority Expected Expires Ordered    EKG 12-lead complete w/read - Clinics Routine  2018    Lipid panel Routine  2018    Hemoglobin A1c Routine  2018    MRI Brain for stroke complete Routine  2018            Who to contact     Please call your clinic at [phone] to:    Ask questions about your health    Make or cancel appointments    Discuss your medicines    Learn about your test results    Speak to your doctor   If you have compliments or concerns about an experience at your clinic, or if you wish to file a complaint, please contact [facility] Patient Relations at [phone] or email us at [email or web address]         Additional Information About Your Visit        LocalGuidingharSMATOOS Information     GoodApril is an electronic gateway that provides easy, online access to your medical records. With GoodApril, you can request a clinic appointment, read your test results, renew a prescription or communicate with your care team.     To sign up for GoodApril visit the website at [email or web address]   You will be asked to enter the access code listed below, as well as some personal information. Please follow the directions to create your username and password.     Your access code is: [email or web address]  Expires: 2018  6:31 AM     Your access code will  in 90 days. If you need help or a new code, please contact your [facility] Clinic or call [phone] for assistance.        Care EveryWhere ID     This is your Care EveryWhere ID. This could be used by other organizations to access your [place] medical records  [id number]         Blood Pressure from Last 3 Encounters:   17 149/67   14 123/53   13 125/74    Weight from Last 3  Encounters:   11/30/17 69.6 kg (153 lb 6.4 oz)   05/08/14 68.5 kg (151 lb)   05/23/13 72.1 kg (159 lb)              We Performed the Following     SPEECH THERAPY REFERRAL        Primary Care Provider Office Phone # Fax #    Casper Ortega [phone]        [facility] [address] [facility] [address]        Equal Access to Services     PRICE ROMERO : Hadii aad ku hadasho Soomaali, waaxda luqadaha, qaybta kaalmada adeegyada, waxay idiin hayaan adeeg skylar lamarleenstanislav . So [facility] [phone].    ATENCIÓN: Si alma rosa arechiga, tiene a silva disposición servicios gratuitos de asistencia lingüística. Mindiame al [phone].    We comply with applicable federal civil rights laws and [place] laws. We do not discriminate on the basis of race, color, national origin, age, disability, sex, sexual orientation, or gender identity.            Thank you!     Thank you for choosing [facility] SPEECH AND LANGUAGE  for your care. Our goal is always to provide you with excellent care. Hearing back from our patients is one way we can continue to improve our services. Please take a few minutes to complete the written survey that you may receive in the mail after your visit with us. Thank you!             Your Updated Medication List - Protect others around you: Learn how to safely use, store and throw away your medicines at www.disposemymeds.org.          This list is accurate as of: 11/30/17  1:39 PM.  Always use your most recent med list.                   Brand Name Dispense Instructions for use Diagnosis    aspirin 81 MG tablet      Take 1 tablet by mouth daily.    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        lovastatin 40 MG tablet    MEVACOR     Take 2 tablets by mouth At Bedtime.    Cutaneous T-cell lymphoma (H)       nitroGLYcerin 0.4 MG sublingual tablet    NITROSTAT     Place 0.4 mg under the tongue every 5 minutes as needed.    Cutaneous T-cell lymphoma (H)       oxybutynin 5 MG tablet    DITROPAN     Take 5 mg by mouth 2 times daily        tamsulosin 0.4 MG capsule    FLOMAX     Take by mouth daily        TOPROL XL 50 MG 24 hr tablet   Generic drug:  metoprolol      Take 50 mg by mouth daily.    Cutaneous T-cell lymphoma (H)       triamcinolone 0.1 % ointment    KENALOG     Apply 1 Application topically daily

## 2017-11-30 NOTE — PROGRESS NOTES
"NEUROLOGY  CLINIC      History of Present Illness:  Mr. 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  He was last seen in May of 2014.  At that time he was continuing to work full time at [employer] despite some fatigue, having cramps that were well controlled with gabapentin, and occasional dysphagia with stable weakness.      He notes progressive worsening of symptoms since then.  In Feb 2017 he was moving boxes and trying to lift the boxes and felt an \"electrical charge\" in right quad and sudden onset of leg weakness leading to a fall.  Had onset of back pain at that same time as well. Tried to continue working and fell an additional 5 times.  Injured left foot during one of the falls and was seen by PCP and had negative XRs at that time.  Did not have any imaging of back or brain at that time.  Feels like leg strength has been significantly worse since then, primarily on that side but feels like left leg gets more fatigued as well due to compensating.  Continues to have some back pain as well. Can walk normally in AM but right leg gets tired more easily.  Denies numbness or tingling.  Right leg feels \"clumsy\" and \"heavy\".  No radicular pain down the leg. No falls besides the one in Feb 2017 but knows that he needs to be cautious with how he moves.  Arms have also gotten weaker over time which he mostly notices when lifting 35+ lbs of food at work but is able to lift up to 30 lbs without problems.    Continues to work 40 hours a week at [employer].  Not interested in cutting back on hours at this point out of \"economic necessity\".  Continues to have cramps in legs, primarily at night, that respond well to stretching and gabapentin.  Feels happy with control of cramps at this point.  Dysphagia has progressed a little bit- has trouble with rice in particular now and occasionally liquids.  He is chewing foods slowly and carefully and has coughing/choking episodes " "about once a week if he takes his time.  Not thickening liquids.   Has not lost weight.  Is wondering if handicap parking sign is appropriate and what prognosis is.        Current Medications:  Current Outpatient Prescriptions   Medication Sig Dispense Refill     gabapentin (NEURONTIN) 100 MG capsule Take 2 capsules (200 mg) by mouth 3 times daily 180 capsule 5     diflorasone (PSORCON) 0.05 % ointment Apply to affected area(s) twice daily 180 g 0     tamsulosin (FLOMAX) 0.4 MG 24 hr capsule Take by mouth daily       lisinopril (PRINIVIL,ZESTRIL) 2.5 MG tablet Take 2.5 mg by mouth daily.       aspirin 81 MG tablet Take 1 tablet by mouth daily.       lovastatin (MEVACOR) 40 MG tablet Take 2 tablets by mouth At Bedtime.       nitroGLYCERIN (NITROSTAT) 0.4 MG SL tablet Place 0.4 mg under the tongue every 5 minutes as needed.       metoprolol (TOPROL XL) 50 MG 24 hr tablet Take 50 mg by mouth daily.         Physical Exam:  Blood pressure 149/67, pulse 62, temperature 98.2  F (36.8  C), temperature source Oral, resp. rate 20, height 1.689 m (5' 6.5\"), weight 69.6 kg (153 lb 6.4 oz), SpO2 98 %.   He has normal extraocular muscles, eyelid closure is weaker on the right compared to left.  He has moderate to severe weakness of lip closure on the right side and mild to moderate on the left.  It is asymmetric.  Tongue shows mild fasciculations and modest atrophy.  Jaw opening and closure is mildly weak.   Deltoid strength is 5 bilaterally, biceps are 4+ bilaterally, triceps 4 bilaterally.  Wrist extension is 5, bilateral FDIs 4, APB 5 on the right and 4 on the left,  5 on the right and 4 on the left.  Hip flexion is 4+ bilaterally, quads are 5, hamstrings 4 on the right and 4+ on the left, foot dorsiflexion 5, and hip adductors are 4+ on the right and 5- on the left.  Tone is mildly increased on the right leg with a spastic catch. Gait shows circumduction gait with right foot drop.  Reflexes are 0 at bilateral patella and " ankles. Plantar responses were bilaterally neutral. Sensory exam shows equal perception of light touch and pinprick in both legs without obvious asymmetry.       Assessment and Plan:  Patient is a 83 yo male with a history of genetically confirmed Be's diseases who presents for follow-up.  In terms of his Be's disease, his strength is overall stable compared to 2014.  Has had some progression of dysphagia and we will have him see SLP for potential diet modification and PT today.    The more pressing issue, however, is the acute change in his right leg strength and gait that occurred in Feb 2017.  This sudden change cannot be explained by his Be's disease.  Given his circumducted gait pattern and increased tone on exam, I am most concerned about the possibility of an ischemic stroke.  We will have him undergo stroke work-up, including MRI/MRA of the brain and neck, fasting lipids, A1c, EKG, and Echo.  He was instructed to continue his daily aspirin.  If stroke work-up is negative, then will consider imaging the lumbar spine, but radiculopathy is less likely given clinical exam.    Return to clinic in 1 year.    Sharri Becker  PM&R resident    Patient seen with Dr. Gonzalez.    ATTENDING ADDENDUM: Patient seen and examined today with resident Dr Becker at the [facility]. I agree with her assessment and recs as above. 40 minutes spent in direct patient interaction, of which greater than 50% in counseling and coordination of care. Alban Gonzalez MD          Answers for HPI/ROS submitted by the patient on 11/30/2017   General Symptoms: No  Skin Symptoms: Yes  HENT Symptoms: No  EYE SYMPTOMS: No  HEART SYMPTOMS: No  LUNG SYMPTOMS: No  INTESTINAL SYMPTOMS: No  URINARY SYMPTOMS: Yes  REPRODUCTIVE SYMPTOMS: No  SKELETAL SYMPTOMS: Yes  BLOOD SYMPTOMS: No  NERVOUS SYSTEM SYMPTOMS: No  MENTAL HEALTH SYMPTOMS: No  Trouble  holding urine or incontinence: Yes  Pain or burning: Yes  Blood in urine: No  Swollen joints: No  Joint pain: No  Bone pain: No

## 2017-11-30 NOTE — MR AVS SNAPSHOT
After Visit Summary   11/30/2017    Paul Allen Franke    MRN: [id number]           Patient Information     Date Of Birth          1935        Visit Information        Provider Department      11/30/2017 12:18 PM Ksenia Baez, [facility] Neurology        Today's Diagnoses     Cutaneous lymphoma (H)    -  1       Follow-ups after your visit        Your next 10 appointments already scheduled     Nov 30, 2017  2:00 PM CST   Ech Complete with [id number]   [facility] Echo ([facility])    [address] [place] [id number] [phone]           1. Please bring or wear a comfortable two-piece outfit. 2. You may eat, drink and take your normal medicines. 3. For any questions that cannot be answered, please contact the ordering physician            Dec 10, 2017  8:30 AM CST   (Arrive by 8:15 AM)   MR BRAIN FOR STROKE COMPLETE with [id number]   [facility] MRI ([facility])    [address] [place] [address] [id number] [phone]           Take your medicines as usual, unless your doctor tells you not to. Bring a list of your current medicines to your exam (including vitamins, minerals and over-the-counter drugs). Also bring the results of similar scans you may have had.  Please remove any body piercings and hair extensions before you arrive.  Follow your doctor s orders. If you do not, we may have to postpone your exam.  You will not have contrast for this exam. You do not need to do anything special to prepare.  The MRI machine uses a strong magnet. Please wear clothes without metal (snaps, zippers). A sweatsuit works well, or we may give you a hospital gown.   **IMPORTANT** THE INSTRUCTIONS BELOW ARE ONLY FOR THOSE PATIENTS WHO HAVE BEEN TOLD THEY WILL RECEIVE SEDATION OR GENERAL ANESTHESIA DURING THEIR MRI PROCEDURE:  IF YOU WILL RECEIVE SEDATION (take medicine to help you relax during your  exam):   You must get the medicine from your doctor before you arrive. Bring the medicine to the exam. Do not take it at home.   Arrive one hour early. Bring someone who can take you home after the test. Your medicine will make you sleepy. After the exam, you may not drive, take a bus or take a taxi by yourself.   No eating 8 hours before your exam. You may have clear liquids up until 4 hours before your exam. (Clear liquids include water, clear tea, black coffee and fruit juice without pulp.)  IF YOU WILL RECEIVE ANESTHESIA (be asleep for your exam):   Arrive 1 1/2 hours early. Bring someone who can take you home after the test. You may not drive, take a bus or take a taxi by yourself.   No eating 8 hours before your exam. You may have clear liquids up until 4 hours before your exam. (Clear liquids include water, clear tea, black coffee and fruit juice without pulp.)   You will spend four to five hours in the recovery room.  Please call the Imaging Department at your exam site with any questions.            Nov 15, 2018  9:30 AM CST   PFT VISIT with  PFL B   [facility] Pulmonary Function Testing ([facility])    [address] [place] [address] [id number] [phone]            Nov 15, 2018 10:00 AM CST   (Arrive by 9:45 AM)   New ALS/Motor Neuron with Alban Gonzalez MD   [facility] Neurology ([facility])    [address] [place] [address] [id number] [phone]              Future tests that were ordered for you today     Open Future Orders        Priority Expected Expires Ordered    EKG 12-lead complete w/read - Clinics Routine  11/30/2018 11/30/2017    Lipid panel Routine  11/30/2018 11/30/2017    Hemoglobin A1c Routine  11/30/2018 11/30/2017    Echocardiogram Routine  11/30/2018 11/30/2017    MRI Brain for stroke complete Routine  11/30/2018 11/30/2017            Who to contact     Please call your clinic at  [phone] to:    Ask questions about your health    Make or cancel appointments    Discuss your medicines    Learn about your test results    Speak to your doctor   If you have compliments or concerns about an experience at your clinic, or if you wish to file a complaint, please contact [facility] Patient Relations at [phone] or email us at [email or web address]         Additional Information About Your Visit        [employer] Information     [employer] is an electronic gateway that provides easy, online access to your medical records. With [employer], you can request a clinic appointment, read your test results, renew a prescription or communicate with your care team.     To sign up for [employer] visit the website at [email or web address]   You will be asked to enter the access code listed below, as well as some personal information. Please follow the directions to create your username and password.     Your access code is: [email or web address]  Expires: 2018  6:31 AM     Your access code will  in 90 days. If you need help or a new code, please contact your [facility] or call [phone] for assistance.        Care EveryWhere ID     This is your Care EveryWhere ID. This could be used by other organizations to access your [place] medical records  [id number]         Blood Pressure from Last 3 Encounters:   17 149/67   14 123/53   13 125/74    Weight from Last 3 Encounters:   17 69.6 kg (153 lb 6.4 oz)   14 68.5 kg (151 lb)   13 72.1 kg (159 lb)              Today, you had the following     No orders found for display       Primary Care Provider Office Phone # Fax #    Casper Ortega [phone]        [facility] [address] [facility] [address]        Equal Access to Services     ALEJANDRA ROMERO : anju Don qaybta kaalmada adeegyada, waxay  sarah bernabe yossi oconnor'aan ah. So [facility] [phone].    ATENCIÓN: Si alma rosa arechiga, tiene a silva disposición servicios gratuitos de asistencia lingüística. Elidia al [phone].    We comply with applicable federal civil rights laws and [place] laws. We do not discriminate on the basis of race, color, national origin, age, disability, sex, sexual orientation, or gender identity.            Thank you!     Thank you for choosing [facility] NEUROLOGY  for your care. Our goal is always to provide you with excellent care. Hearing back from our patients is one way we can continue to improve our services. Please take a few minutes to complete the written survey that you may receive in the mail after your visit with us. Thank you!             Your Updated Medication List - Protect others around you: Learn how to safely use, store and throw away your medicines at www.disposemymeds.org.          This list is accurate as of: 11/30/17 12:26 PM.  Always use your most recent med list.                   Brand Name Dispense Instructions for use Diagnosis    aspirin 81 MG tablet      Take 1 tablet by mouth daily.    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        lovastatin 40 MG tablet    MEVACOR     Take 2 tablets by mouth At Bedtime.    Cutaneous T-cell lymphoma (H)       nitroGLYcerin 0.4 MG sublingual tablet    NITROSTAT     Place 0.4 mg under the tongue every 5 minutes as needed.    Cutaneous T-cell lymphoma (H)       oxybutynin 5 MG tablet    DITROPAN     Take 5 mg by mouth 2 times daily         tamsulosin 0.4 MG capsule    FLOMAX     Take by mouth daily        TOPROL XL 50 MG 24 hr tablet   Generic drug:  metoprolol      Take 50 mg by mouth daily.    Cutaneous T-cell lymphoma (H)       triamcinolone 0.1 % ointment    KENALOG     Apply 1 Application topically daily

## 2017-11-30 NOTE — NURSING NOTE
Chief Complaint   Patient presents with     Consult     [facility]ON [facility]ERT[facility], [facility]

## 2017-12-01 LAB — INTERPRETATION ECG - MUSE: NORMAL

## 2017-12-02 NOTE — PROGRESS NOTES
"    OUTPATIENT OCCUPATIONAL THERAPY CLINIC NOTE    Type of visit:  Evaluation            Date of Service:17 and last date of OT:  2013    Referring provider: Dr. 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    Additional Occupational Profile Information (patterns of daily living, interests, values and needs): Pt is a  living with eldest daughter's support    Cardio-respiratory status:  FVC: 101% of predicted    Height/Weight: 5'6.5\"/69.6kg    Living environment:  Apartment with eldest daughter    Living environment barriers:  1 stairs to enter (no railing present), to sidewalk out front, able to hold onto stone work     Tub/shower  Has a high toilet with vanity near to hold  Current assistance/living environment:  Lives with Daughter who completes cooking tasks. Dtr doesn't work, home on disability. Wife  a year ago.      Current mobility equipment:  Cane, not using    Current ADL equipment:  Shower/tub chair   Wall suction cup bar in shower  Technology used: No    Patient concerns/goals: notes buttons are getting harder as hands are not working as well    Evaluation   Interview completed.   Pain assessment:  Pain low back per PT assessment    Range of motion:  B UE AROM WFL     Manual muscle testing: UE  is strong yamileth and key pinch also with palmar pinch weak yamileth.  Has arthritic changes observed in hands.    Cognition:  Appears WFL today    ADL:  Patient completing all dressing, grooming and bathing tasks independently. Notes buttoning takes longer and harder to do.    IADL: does most cleaning, drives, does shopping and finances and dtr does meals    Fall Risk Screen:   Has the patient fallen 2 or more times in the last year? Yes      Has the patient fallen and " had an injury in the past year? Yes, L foot injured       Timed Up and Go Score: See PT Notes    Is the patient a fall risk? Yes, department fall risk interventions implemented     Impairments:  Fatigue  Balance  Weakness  Coordination  pain     Treatment diagnosis:  Impaired activities of daily living and mobility     Assessment of Occupational Performance: 1-3 Performance Deficits  Identified Performance Deficits (ie: feeding, social skills): buttoning, toilet transfer  Clinical Decision Making (Complexity): Low complexity  Recommendations/Plan of care:  Patient would benefit from interventions to enhance safety and independence.  Rehab potential good for stated goals.  Occupational therapy intervention for  self care/home management.  1 session evaluation & treatment.  Equipment recommended: see below  Goals:   Target date: today  Patient, family and/or caregiver will verbalize understanding of evaluation results and implications for functional performance.  Patient, family and/or caregiver will verbalize/demonstrate understanding of compensatory methods /equipment to enhance functional independence and safety.    Educational assessment/barriers to learning:  None noted    Treatment provided this date:   Self care/home management, 8 minutes of training in:   -button hook and resources to acquire and demonstrated use for patient today. States daughter could help him order it off website   -training in TSF available but declines need pushing off on front edge of toilet with one hand and vanity with other     Response to treatment/recommendations: Receptive.  Goal attainment:  All goals met    Risks and benefits of evaluation/treatment have been explained.  Patient, family and/or caregiver are in agreement with Plan of Care.     Timed Code Treatment Minutes: 8  Total Treatment Time (sum of timed and untimed services): 18  PETER Barba/L, MSCS    Date: 11/30/17    [employer] Platinum-Medicare B replacement    Medicare  G-code:  Self Care  Current Status , Goal ,  Discharge   1 session only, modifier the same for all G-codes.  CI: 1-19% impairment  Modifier determined by clinical judgment in conjunction with objective data and subjective report.      Certification:  Onset date: 11/30/17  Start of care date: 11/30/2017  Certification date from 11/30/2017 to 11/30/17    I CERTIFY THE NEED FOR THESE SERVICES FURNISHED UNDER        THIS PLAN OF TREATMENT AND WHILE UNDER MY CARE     (Physician attestation of this document indicates review and certification of the therapy plan).

## 2017-12-05 LAB
EXPTIME-PRE: 7.23 SEC
FEF2575-%PRED-PRE: 191 %
FEF2575-PRE: 3.54 L/SEC
FEF2575-PRED: 1.85 L/SEC
FEFMAX-%PRED-PRE: 143 %
FEFMAX-PRE: 9.25 L/SEC
FEFMAX-PRED: 6.46 L/SEC
FEV1-%PRED-PRE: 116 %
FEV1-PRE: 3.05 L
FEV1FEV6-PRE: 86 %
FEV1FEV6-PRED: 76 %
FEV1FVC-PRE: 85 %
FEV1FVC-PRED: 75 %
FIFMAX-PRE: 4.61 L/SEC
FVC-%PRED-PRE: 101 %
FVC-PRE: 3.57 L
FVC-PRED: 3.53 L
MEP-PRE: 75 CMH2O
MIP-PRE: -65 CMH2O

## 2017-12-22 ENCOUNTER — RADIANT APPOINTMENT (OUTPATIENT)
Dept: MRI IMAGING | Facility: CLINIC | Age: 82
End: 2017-12-22
Attending: PSYCHIATRY & NEUROLOGY
Payer: COMMERCIAL

## 2017-12-22 DIAGNOSIS — R29.5 TRANSIENT PARALYSIS: ICD-10-CM

## 2017-12-22 DIAGNOSIS — R29.898 RIGHT LEG WEAKNESS: ICD-10-CM

## 2017-12-22 DIAGNOSIS — R79.9 ABNORMAL FINDING OF BLOOD CHEMISTRY: ICD-10-CM

## 2017-12-22 LAB
CHOLEST SERPL-MCNC: 151 MG/DL
HBA1C MFR BLD: 5.9 % (ref 4.3–6)
HDLC SERPL-MCNC: 60 MG/DL
LDLC SERPL CALC-MCNC: 42 MG/DL
NONHDLC SERPL-MCNC: 90 MG/DL
TRIGL SERPL-MCNC: 240 MG/DL

## 2017-12-22 RX ORDER — GADOBUTROL 604.72 MG/ML
7.5 INJECTION INTRAVENOUS ONCE
Status: COMPLETED | OUTPATIENT
Start: 2017-12-22 | End: 2017-12-22

## 2017-12-22 RX ADMIN — GADOBUTROL 7.5 ML: 604.72 INJECTION INTRAVENOUS at 14:31

## 2017-12-22 NOTE — DISCHARGE INSTRUCTIONS
MRI Contrast Discharge Instructions    The IV contrast you received today will pass out of your body in your  urine. This will happen in the next 24 hours. You will not feel this process.  Your urine will not change color.    Drink at least 4 extra glasses of water or juice today (unless your doctor  has restricted your fluids). This reduces the stress on your kidneys.  You may take your regular medicines.    If you are on dialysis: It is best to have dialysis today.    If you have a reaction: Most reactions happen right away. If you have  any new symptoms after leaving the hospital (such as hives or swelling),  call your hospital at the correct number below. Or call your family doctor.  If you have breathing distress or wheezing, call 911.    Special instructions: ***    I have read and understand the above information.    Signature:______________________________________ Date:___________    Staff:__________________________________________ Date:___________     Time:__________    [facility] Departments:    ___[place]: [phone]  ___[facility]: [phone]  ___[place]: [phone] ___[facility]: [phone]  ___[facility]: [phone]  ___[facility]: [phone]  ___[phone]  ___[facility]: [phone]  ___[phone]

## 2018-01-08 ENCOUNTER — RADIANT APPOINTMENT (OUTPATIENT)
Dept: MRI IMAGING | Facility: CLINIC | Age: 83
End: 2018-01-08
Attending: PSYCHIATRY & NEUROLOGY
Payer: COMMERCIAL

## 2018-01-08 DIAGNOSIS — R29.898 RIGHT LEG WEAKNESS: ICD-10-CM

## 2018-01-08 PROCEDURE — 72148 MRI LUMBAR SPINE W/O DYE: CPT | Performed by: RADIOLOGY

## 2018-02-15 ENCOUNTER — OFFICE VISIT (OUTPATIENT)
Dept: PHYSICAL MEDICINE AND REHAB | Facility: CLINIC | Age: 83
End: 2018-02-15
Payer: COMMERCIAL

## 2018-02-15 VITALS
TEMPERATURE: 97.7 F | BODY MASS INDEX: 24.2 KG/M2 | HEIGHT: 67 IN | OXYGEN SATURATION: 100 % | WEIGHT: 154.2 LBS | HEART RATE: 58 BPM | SYSTOLIC BLOOD PRESSURE: 129 MMHG | DIASTOLIC BLOOD PRESSURE: 70 MMHG

## 2018-02-15 DIAGNOSIS — R29.898 WEAKNESS OF RIGHT LOWER EXTREMITY: Primary | ICD-10-CM

## 2018-02-15 ASSESSMENT — PAIN SCALES - GENERAL: PAINLEVEL: NO PAIN (0)

## 2018-02-15 NOTE — MR AVS SNAPSHOT
After Visit Summary   2/15/2018    Paul Allen Franke    MRN: [id number]           Patient Information     Date Of Birth          1935        Visit Information        Provider Department      2/15/2018 10:30 AM Izaiah Parada DO [facility] Physical Medicine and Rehabilitation        Today's Diagnoses     Weakness of right lower extremity    -  1      Care Instructions    We addressed the following today:    1. Weakness of the right lower extremity    Activity modification as discussed  Other specific instructions: Call if interested in physical therapy, further diagnostic testing, or physician referral as discussed for further evaluation/treatment purposes  Follow up as needed for further evaluation/medical care (sooner if needed; call direct clinic number [[phone]] at any time with questions/concerns)            Follow-ups after your visit        Your next 10 appointments already scheduled     Nov 15, 2018  9:30 AM CST   PFT VISIT with  PFL EDINSON   [facility] Pulmonary Function Testing ([facility])    [address] [place] [id number] [phone]            Nov 15, 2018 10:00 AM CST   (Arrive by 9:45 AM)   New ALS/Motor Neuron with Alban Gonzalez MD   [facility] Neurology ([facility])    [address] [place] [id number] [phone]              Who to contact     Please call your clinic at [phone] to:    Ask questions about your health    Make or cancel appointments    Discuss your medicines    Learn about your test results    Speak to your doctor            Additional Information About Your Visit        MyChart Information     Location[employer] is an electronic gateway that provides easy, online access to your medical records. With Location[employer], you can request a clinic appointment, read your test results, renew a prescription or communicate with your care team.     To sign up for  "MyChart visit the website at [email or web address]   You will be asked to enter the access code listed below, as well as some personal information. Please follow the directions to create your username and password.     Your access code is: [email or web address]  Expires: 2018  6:30 AM     Your access code will  in 90 days. If you need help or a new code, please contact your [facility] or call [phone] for assistance.        Care EveryWhere ID     This is your Care EveryWhere ID. This could be used by other organizations to access your [facility] medical records  [id number]        Your Vitals Were     Pulse Temperature Height Pulse Oximetry BMI (Body Mass Index)       58 97.7  F (36.5  C) (Oral) 1.689 m (5' 6.5\") 100% 24.52 kg/m2        Blood Pressure from Last 3 Encounters:   02/15/18 129/70   17 149/67   14 123/53    Weight from Last 3 Encounters:   02/15/18 69.9 kg (154 lb 3.2 oz)   17 69.6 kg (153 lb 6.4 oz)   14 68.5 kg (151 lb)              Today, you had the following     No orders found for display       Primary Care Provider Office Phone # Fax #    Casper Ortega [phone]        [facility] [address] [place] [address]        Equal Access to Services     ALEJANDRA ROMERO : Hadii tarah gautam hadasho Sorosa elenaali, waaxda luqadaha, qaybta kaalmada carlitos, matias huerta . So [facility] [phone].    ATENCIÓN: Si habla geni, tiene a silva disposición servicios gratuitos de asistencia lingüística. Llame al [phone].    We comply with applicable federal civil rights laws and [place] laws. We do not discriminate on the basis of race, color, national origin, age, disability, sex, sexual orientation, or gender identity.            Thank you!     Thank you for choosing [facility] PHYSICAL MEDICINE AND REHABILITATION  for your care. Our goal is always to provide you with excellent care. Hearing back " from our patients is one way we can continue to improve our services. Please take a few minutes to complete the written survey that you may receive in the mail after your visit with us. Thank you!             Your Updated Medication List - Protect others around you: Learn how to safely use, store and throw away your medicines at www.disposemymeds.org.          This list is accurate as of 2/15/18 11:21 AM.  Always use your most recent med list.                   Brand Name Dispense Instructions for use Diagnosis    aspirin 81 MG tablet      Take 1 tablet by mouth daily.    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        lovastatin 40 MG tablet    MEVACOR     Take 2 tablets by mouth At Bedtime.    Cutaneous T-cell lymphoma (H)       nitroGLYcerin 0.4 MG sublingual tablet    NITROSTAT     Place 0.4 mg under the tongue every 5 minutes as needed.    Cutaneous T-cell lymphoma (H)       oxybutynin 5 MG tablet    DITROPAN     Take 5 mg by mouth 2 times daily        TOPROL XL 50 MG 24 hr tablet   Generic drug:  metoprolol succinate      Take 50 mg by mouth daily.    Cutaneous T-cell lymphoma (H)       triamcinolone 0.1 % ointment    KENALOG     Apply 1 Application topically daily

## 2018-02-15 NOTE — LETTER
"2/15/2018     RE: Paul Allen Franke  [address]  [place] [address]     Dear Colleague,    Thank you for referring your patient, Paul Allen Franke, to the [facility] PHYSICAL MEDICINE AND REHABILITATION at [facility]. Please see a copy of my visit note below.    [facility] Physical Medicine & Rehabilitation Clinic Note  Clinic Visit s Feb 15, 2018    Subjective:  Paul Allen Franke is a 82 year old male who is seen in consultation at the request of Dr. Gonzalez for evaluation of right leg weakness.    Symptoms began 1 year ago.  Patient describes injury as multiple falls at that time.  Reports right leg weakness without back pain.  Pain is 0/10 currently.  Symptoms are generally worse with walking activities and at the end of the day and better with nothing in particular.  No treatment initiated to date.  Associated symptoms include denies any bowel/bladder dysfunction or saddle anesthesia.  Denies any numbness/tingling of the right lower extremity.  Denies any fevers or chills.  Denies any locking, popping, catching, clicking, bruising, or swelling of the right knee.  Denies any family history of rheumatologic disorders.  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  Notes long term use of Lovastatin.  Denies any previous low back/right hip/right knee injuries/surgeries.    Patient's past medical, surgical, social, and family histories are reviewed today.  There are no significant contributory medical issues  History reviewed. No pertinent past medical history.    Review of Systems:  Constitutional: NEGATIVE for fever, chills, or change in weight  Skin: NEGATIVE for new onset worrisome rashes, moles, or lesions  Neuro: POSITIVE for weakness of the right lower extremity  MSK: see HPI    Objective:  /70 (BP Location: Right arm, Patient Position: Sitting, Cuff Size: Adult Regular)  Pulse 58  Temp 97.7  F (36.5  C) (Oral)  Ht 1.689 m (5' 6.5\")  Wt " 69.9 kg (154 lb 3.2 oz)  SpO2 100%  BMI 24.52 kg/m2  General: healthy, alert, and in no distress  Skin: no suspicious lesions or rashes  Psych: mentation appears normal and affect normal/bright  HEENT: no scleral icterus  CV: no pedal edema  Resp: normal respiratory effort without conversational dyspnea   Neuro: sensory exam is within normal limits.  Motor strength as noted below  Lymph: no palpable lymphadenopathy    MSK:    THORACIC/LUMBAR SPINE  Inspection:    No redness, swelling, overlying skin change, or gross deformity/asymmetry  Palpation:    No tenderness over the lumbar spinous processes, left para lumbar muscles, right para lumbar muscles, left SI joint, right SI joint, left sciatic notch, or right sciatic notch  Range of Motion:     Lumbar flexion within normal limits    Lumbar extension within normal limits  Strength:    5/5 - quadriceps, hamstrings, tibialis anterior, gastrocsoleus, and extensor hallicus longus  Special Tests:    Positive: None    Negative: Straight leg raise (left) and femoral stretch test (left)    RIGHT KNEE  Inspection:    No edema, erythema, or ecchymosis present  Palpation:    Not tender over the patella tendon, quadriceps insertion, lateral joint line, medial joint line, medial tibial plateau, lateral tibial plateau, medial femoral condyle, lateral femoral condyle, or prepatellar bursa    No effusion is present    Patellofemoral crepitus is present  Strength:    Extensor mechanism intact  Special Tests:    Positive: None    Negative: Patellar grind, patellar apprehension, MCL/valgus stress (0 & 30 deg), LCL/varus stress (0 & 30 deg), Lachman's, ballottement, quadriceps active test, Donna's, and bounce home    RIGHT HIP  Passive Range of Motion:    Flexion within normal limits / IR within normal limits / ER within normal limits  Strength:    Flexion 5/5 / extension 5/5 / adduction 5/5 / abduction 5-/5  Special Tests:    Positive: BASHIR - lateral    Negative: Logroll and  anterior impingement (FADIR)    Imaging:  No x-rays indicated during today's visit  Previous films were reviewed today, independent visualization of images was performed, and results were discussed with the patient  MR of the Lumbar Spine without Contrast - 1/8/2018  Impression:  1. Multilevel lumbar spondylosis with bilateral neural foraminal stenosis at L4-5 and L5-S1 potentially impinging on the corresponding exiting L4 and L5 nerve roots. No high-grade spinal canal stenosis.    MRI of the Brain, MR of the Neck, and MRA of the Head - 12/2017  Impression:  1. No finding to explain the patient's symptoms.  2. Moderate parenchymal volume loss and leukoaraiosis.  3. Normal head/neck MRA.    ASSESSMENT:  1. Weakness of the right lower extremity    PLAN:  1. Activity modification as discussed, including limitation of activities that cause pain/discomfort.  2. Call if interested in formal physical therapy, further diagnostic testing (MRI of the right thigh, EMG/NCV of the right lower extremity, muscle biopsy, laboratory testing including LDH, EDUARDO, aldolase, CK, etc), or physician referral (second neurology opinion/consultation, rheumatology referral/consultation, or primary care provider to discuss additional medical workup) as discussed for further evaluation/treatment purposes.  3. Follow-up as needed for further evaluation/medical care.      I spent a total of 35 minutes face-to-face with the patient during today's office visit.  See office note for details.    Izaiah Parada DO, GISSEL    Department of Rehabilitation Medicine

## 2018-02-15 NOTE — PROGRESS NOTES
"[facility] Physical Medicine & Rehabilitation Clinic Note  Clinic Visit s Feb 15, 2018    Subjective:  Paul Allen Franke is a 82 year old male who is seen in consultation at the request of Dr. Gonzalez for evaluation of right leg weakness.    Symptoms began 1 year ago.  Patient describes injury as multiple falls at that time.  Reports right leg weakness without back pain.  Pain is 0/10 currently.  Symptoms are generally worse with walking activities and at the end of the day and better with nothing in particular.  No treatment initiated to date.  Associated symptoms include denies any bowel/bladder dysfunction or saddle anesthesia.  Denies any numbness/tingling of the right lower extremity.  Denies any fevers or chills.  Denies any locking, popping, catching, clicking, bruising, or swelling of the right knee.  Denies any family history of rheumatologic disorders.  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  Notes long term use of Lovastatin.  Denies any previous low back/right hip/right knee injuries/surgeries.    Patient's past medical, surgical, social, and family histories are reviewed today.  There are no significant contributory medical issues  History reviewed. No pertinent past medical history.    Review of Systems:  Constitutional: NEGATIVE for fever, chills, or change in weight  Skin: NEGATIVE for new onset worrisome rashes, moles, or lesions  Neuro: POSITIVE for weakness of the right lower extremity  MSK: see HPI    Objective:  /70 (BP Location: Right arm, Patient Position: Sitting, Cuff Size: Adult Regular)  Pulse 58  Temp 97.7  F (36.5  C) (Oral)  Ht 1.689 m (5' 6.5\")  Wt 69.9 kg (154 lb 3.2 oz)  SpO2 100%  BMI 24.52 kg/m2  General: healthy, alert, and in no distress  Skin: no suspicious lesions or rashes  Psych: mentation appears normal and affect normal/bright  HEENT: no scleral icterus  CV: no pedal edema  Resp: normal respiratory effort without conversational dyspnea   Neuro: " sensory exam is within normal limits.  Motor strength as noted below  Lymph: no palpable lymphadenopathy    MSK:    THORACIC/LUMBAR SPINE  Inspection:    No redness, swelling, overlying skin change, or gross deformity/asymmetry  Palpation:    No tenderness over the lumbar spinous processes, left para lumbar muscles, right para lumbar muscles, left SI joint, right SI joint, left sciatic notch, or right sciatic notch  Range of Motion:     Lumbar flexion within normal limits    Lumbar extension within normal limits  Strength:    5/5 - quadriceps, hamstrings, tibialis anterior, gastrocsoleus, and extensor hallicus longus  Special Tests:    Positive: None    Negative: Straight leg raise (left) and femoral stretch test (left)    RIGHT KNEE  Inspection:    No edema, erythema, or ecchymosis present  Palpation:    Not tender over the patella tendon, quadriceps insertion, lateral joint line, medial joint line, medial tibial plateau, lateral tibial plateau, medial femoral condyle, lateral femoral condyle, or prepatellar bursa    No effusion is present    Patellofemoral crepitus is present  Strength:    Extensor mechanism intact  Special Tests:    Positive: None    Negative: Patellar grind, patellar apprehension, MCL/valgus stress (0 & 30 deg), LCL/varus stress (0 & 30 deg), Lachman's, ballottement, quadriceps active test, Donna's, and bounce home    RIGHT HIP  Passive Range of Motion:    Flexion within normal limits / IR within normal limits / ER within normal limits  Strength:    Flexion 5/5 / extension 5/5 / adduction 5/5 / abduction 5-/5  Special Tests:    Positive: BASHIR - lateral    Negative: Logroll and anterior impingement (FADIR)    Imaging:  No x-rays indicated during today's visit  Previous films were reviewed today, independent visualization of images was performed, and results were discussed with the patient  MR of the Lumbar Spine without Contrast - 1/8/2018  Impression:  1. Multilevel lumbar spondylosis with  bilateral neural foraminal stenosis at L4-5 and L5-S1 potentially impinging on the corresponding exiting L4 and L5 nerve roots. No high-grade spinal canal stenosis.    MRI of the Brain, MR of the Neck, and MRA of the Head - 12/2017  Impression:  1. No finding to explain the patient's symptoms.  2. Moderate parenchymal volume loss and leukoaraiosis.  3. Normal head/neck MRA.    ASSESSMENT:  1. Weakness of the right lower extremity    PLAN:  1. Activity modification as discussed, including limitation of activities that cause pain/discomfort.  2. Call if interested in formal physical therapy, further diagnostic testing (MRI of the right thigh, EMG/NCV of the right lower extremity, muscle biopsy, laboratory testing including LDH, EDUARDO, aldolase, CK, etc), or physician referral (second neurology opinion/consultation, rheumatology referral/consultation, or primary care provider to discuss additional medical workup) as discussed for further evaluation/treatment purposes.  3. Follow-up as needed for further evaluation/medical care.      I spent a total of 35 minutes face-to-face with the patient during today's office visit.  See office note for details.    Izaiah Parada DO, GISSEL    Department of Rehabilitation Medicine

## 2018-02-15 NOTE — PATIENT INSTRUCTIONS
We addressed the following today:    1. Weakness of the right lower extremity    Activity modification as discussed  Other specific instructions: Call if interested in physical therapy, further diagnostic testing, or physician referral as discussed for further evaluation/treatment purposes  Follow up as needed for further evaluation/medical care (sooner if needed; call direct clinic number [[phone]] at any time with questions/concerns)

## 2018-11-15 ENCOUNTER — THERAPY VISIT (OUTPATIENT)
Dept: SPEECH THERAPY | Facility: CLINIC | Age: 83
End: 2018-11-15
Payer: COMMERCIAL

## 2018-11-15 ENCOUNTER — THERAPY VISIT (OUTPATIENT)
Dept: PHYSICAL THERAPY | Facility: CLINIC | Age: 83
End: 2018-11-15
Payer: COMMERCIAL

## 2018-11-15 ENCOUNTER — OFFICE VISIT (OUTPATIENT)
Dept: NEUROLOGY | Facility: CLINIC | Age: 83
End: 2018-11-15
Payer: COMMERCIAL

## 2018-11-15 VITALS
SYSTOLIC BLOOD PRESSURE: 159 MMHG | HEIGHT: 67 IN | WEIGHT: 150 LBS | DIASTOLIC BLOOD PRESSURE: 70 MMHG | BODY MASS INDEX: 23.54 KG/M2 | OXYGEN SATURATION: 98 % | HEART RATE: 71 BPM

## 2018-11-15 DIAGNOSIS — R13.12 OROPHARYNGEAL DYSPHAGIA: Primary | ICD-10-CM

## 2018-11-15 DIAGNOSIS — Z74.09 IMPAIRED MOBILITY AND ADLS: Primary | ICD-10-CM

## 2018-11-15 DIAGNOSIS — G12.1 KENNEDY'S DISEASE (H): Primary | ICD-10-CM

## 2018-11-15 DIAGNOSIS — Z78.9 IMPAIRED MOBILITY AND ADLS: Primary | ICD-10-CM

## 2018-11-15 DIAGNOSIS — R47.1 DYSARTHRIA: ICD-10-CM

## 2018-11-15 DIAGNOSIS — G12.1 KENNEDY'S DISEASE (H): ICD-10-CM

## 2018-11-15 ASSESSMENT — REVISED AMYOTROPHIC LATERAL SCLEROSIS FUNCTIONAL RATING SCALE (ALSFRS-R)
DYSPNEA: 4
CUTTING_FOOD_AND_HANDLING_UTENSILES: 4
SPEECH: NORMAL SPEECH PROCESSES
DRESSING_AND_HYGEINE: INTERMITTENT ASSISTANCE OR SUBSTITUTE METHODS
SWALLOWING: 3
ORTHOPENA: NEEDS EXTRA PILLOWS IN ORDER TO SLEEP (MORE THAN TWO)
HANDWRITING: 2
BULBAR_SUBTOTAL: 11
SALIVATION: NORMAL
DRESSING_AND_HYGEINE: 2
RESPIRATORY_INSUFFICIENCY: 4
TURNING_IN_BED_AND_ADJUSTING_BED_CLOTHES: 4
TURNING_IN_BED_AND_ADJUSTING_BED_CLOTHES: NORMAL
GROSS_MOTOR_SUBTOTAL_SCORE: 8
CLIMBING_STAIRS: NEEDS ASSISTANCE
CLIMBING_STAIRS: 1
SPEECH: 4
ALSFRS_TOTAL_SCORE: 37
HANDWRITING: NOT ALL WORDS ARE LEGIBLE
SIX_ITEM_SUBTOTAL: 19
FINE_MOTOR_SUBTOTAL_SCORE: 8
SWALLOWING: EARLY EATING PROBLEMS - OCCASIONAL CHOKING
ORTHOPENA: 2
SALIVATION: 4
RESPIRATORY_SUBTOTAL_SCORE: 10
WALKING: 3
WALKING: EARLY AMBULATION DIFFICULTIES

## 2018-11-15 NOTE — MR AVS SNAPSHOT
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)           Follow-ups after your visit        Your next 10 appointments already scheduled     2019  9:30 AM CST   (Arrive by 9:15 AM)   Return ALS/Motor Neuron with Alban Gonzalez MD   [facility] Neurology ([facility])    [address] [place] [id number] [phone]              Future tests that were ordered for you today     Open Future Orders        Priority Expected Expires Ordered    PHYSICAL THERAPY REFERRAL Routine 11/15/2018 2019 2018    OCCUPATIONAL THERAPY REFERRAL Routine 11/15/2018 2019 2018    SPEECH THERAPY REFERRAL Routine 11/15/2018 2019 2018            Who to contact     Please call your clinic at [phone] to:    Ask questions about your health    Make or cancel appointments    Discuss your medicines    Learn about your test results    Speak to your doctor            Additional Information About Your Visit        MyChart Information     [employer] is an electronic gateway that provides easy, online access to your medical records. With [employer], you can request a clinic appointment, read your test results, renew a prescription or communicate with your care team.     To sign up for [employer] visit the website at [email or web address]   You will be asked to enter the access code listed below, as well as some personal information. Please follow the directions to create your username and password.     Your access code is: [email or web address]  Expires: 2019  5:31 AM     Your access code will  in 90 days. If you need  help or a new code, please contact your [facility] or call [phone] for assistance.        Care EveryWhere ID     This is your Care EveryWhere ID. This could be used by other organizations to access your [place] medical records  [id number]         Blood Pressure from Last 3 Encounters:   11/15/18 159/70   02/15/18 129/70   11/30/17 149/67    Weight from Last 3 Encounters:   11/15/18 68 kg (150 lb)   02/15/18 69.9 kg (154 lb 3.2 oz)   11/30/17 69.6 kg (153 lb 6.4 oz)              Today, you had the following     No orders found for display       Primary Care Provider Office Phone # Fax #    Casper Ortega [phone]        [facility] [address] [facility] [address]        Equal Access to Services     ALEJANDRA ROMERO : Hadii tarah gautam hadasho Soomaali, waaxda luqadaha, qaybta kaalmada adeegyada, matias huerta . So [facility] [phone].    ATENCIÓN: Si habla español, tiene a silva disposición servicios gratuitos de asistencia lingüística. Elidia al [phone].    We comply with applicable federal civil rights laws and [place] laws. We do not discriminate on the basis of race, color, national origin, age, disability, sex, sexual orientation, or gender identity.            Thank you!     Thank you for choosing [facility] PHYSICAL THERAPY AND REHAB  for your care. Our goal is always to provide you with excellent care. Hearing back from our patients is one way we can continue to improve our services. Please take a few minutes to complete the written survey that you may receive in the mail after your visit with us. Thank you!             Your Updated Medication List - Protect others around you: Learn how to safely use, store and throw away your medicines at www.disposemymeds.org.          This list is accurate as of 11/15/18 12:06 PM.  Always use your most recent med list.                   Brand Name Dispense Instructions for use Diagnosis     aspirin 81 MG tablet      Take 1 tablet by mouth daily.    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        lovastatin 40 MG tablet    MEVACOR     Take 2 tablets by mouth At Bedtime.    Cutaneous T-cell lymphoma (H)       nitroGLYcerin 0.4 MG sublingual tablet    NITROSTAT     Place 0.4 mg under the tongue every 5 minutes as needed.    Cutaneous T-cell lymphoma (H)       oxybutynin 5 MG tablet    DITROPAN     Take 5 mg by mouth 2 times daily        TOPROL XL 50 MG 24 hr tablet   Generic drug:  metoprolol succinate      Take 50 mg by mouth daily.    Cutaneous T-cell lymphoma (H)       triamcinolone 0.1 % ointment    KENALOG     Apply 1 Application topically daily

## 2018-11-15 NOTE — MR AVS SNAPSHOT
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)        Dysarthria           Follow-ups after your visit        Your next 10 appointments already scheduled     2019  9:30 AM CST   (Arrive by 9:15 AM)   Return ALS/Motor Neuron with Alban Gonzalez MD   [facility] Neu[facility] ([facility])    [address] [place] [address] [phone]              Future tests that were ordered for you today     Open Future Orders        Priority Expected Expires Ordered    PHYSICAL THERAPY REFERRAL Routine 11/15/2018 2019 2018    OCCUPATIONAL THERAPY REFERRAL Routine 11/15/2018 2019 2018    SPEECH THERAPY REFERRAL Routine 11/15/2018 2019 2018            Who to contact     Please call your clinic at [phone] to:    Ask questions about your health    Make or cancel appointments    Discuss your medicines    Learn about your test results    Speak to your doctor            Additional Information About Your Visit        MyChart Information     Create! [employer] Collective[employer] is an electronic gateway that provides easy, online access to your medical records. With [employer], you can request a clinic appointment, read your test results, renew a prescription or communicate with your care team.     To sign up for Yoolit visit the website at [email or web address]   You will be asked to enter the access code listed below, as well as some personal information. Please follow the directions to create your username and password.     Your access code is: [email or web address]  Expires: 2019  5:31 AM     Your access code will  in 90 days. If  you need help or a new code, please contact your [facility] or call [phone] for assistance.        Care EveryWhere ID     This is your Care EveryWhere ID. This could be used by other organizations to access your [place] medical records  [id number]         Blood Pressure from Last 3 Encounters:   11/15/18 159/70   02/15/18 129/70   11/30/17 149/67    Weight from Last 3 Encounters:   11/15/18 68 kg (150 lb)   02/15/18 69.9 kg (154 lb 3.2 oz)   11/30/17 69.6 kg (153 lb 6.4 oz)              Today, you had the following     No orders found for display       Primary Care Provider Office Phone # Fax #    Casper Ortega [phone]        [facility] [address] [facility] [address]        Equal Access to Services     ALEJANDRA [facility] HospitalMAURY : Hadii tarah gautam hadasho Soomaali, waaxda luqadaha, qaybta kaalmada adeegyada, matias huerta . So [facility] [phone].    ATENCIÓN: Si habla español, tiene a silva disposición servicios gratuitos de asistencia lingüística. Mindiame al [phone].    We comply with applicable federal civil rights laws and [place] laws. We do not discriminate on the basis of race, color, national origin, age, disability, sex, sexual orientation, or gender identity.            Thank you!     Thank you for choosing [facility] SPEECH AND LANGUAGE  for your care. Our goal is always to provide you with excellent care. Hearing back from our patients is one way we can continue to improve our services. Please take a few minutes to complete the written survey that you may receive in the mail after your visit with us. Thank you!             Your Updated Medication List - Protect others around you: Learn how to safely use, store and throw away your medicines at www.disposemymeds.org.          This list is accurate as of 11/15/18  1:02 PM.  Always use your most recent med list.                   Brand Name Dispense Instructions for use  Diagnosis    aspirin 81 MG tablet      Take 1 tablet by mouth daily.    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        lovastatin 40 MG tablet    MEVACOR     Take 2 tablets by mouth At Bedtime.    Cutaneous T-cell lymphoma (H)       nitroGLYcerin 0.4 MG sublingual tablet    NITROSTAT     Place 0.4 mg under the tongue every 5 minutes as needed.    Cutaneous T-cell lymphoma (H)       oxybutynin 5 MG tablet    DITROPAN     Take 5 mg by mouth 2 times daily        TOPROL XL 50 MG 24 hr tablet   Generic drug:  metoprolol succinate      Take 50 mg by mouth daily.    Cutaneous T-cell lymphoma (H)       triamcinolone 0.1 % ointment    KENALOG     Apply 1 Application topically daily

## 2018-11-15 NOTE — Clinical Note
Medicare Certification - Add your Attestation 1. Review the Certification Documentation below 2. Click 'QuickNote' on your toolbar 3. Add P MEDICARE CERTIFICATION-[facility] as a recipient 4. Add your attestation using .[email or web address] and choose Rehab Services Attestation - Physician (at the bottom) 5. Click 'Save as QuickAction' and name the Button '[facility] Rehab Cert.' Click 'Accept.' This is a onetime set up. You will now have a [facility] Rehab Cert button on the right side of the inbasket toolbar so the next time you need to add your attestation just, click the button. You will not have to go through any other steps. 6. Click 'Sign and Route'

## 2018-11-15 NOTE — LETTER
"11/15/2018       RE: Paul Allen Franke  [address]  [place] [address]     Dear Colleague,    Thank you for referring your patient, Paul Allen Franke, to the [facility] NEUROLOGY at [facility]. Please see a copy of my visit note below.    Service Date: 11/15/2018      November 15, 2018        Casper Ortega MD   [address], [address]   [place] [address]      RE: Paul A. Franke    MRN: [id number]   : 1935              Dear Dr. 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  He was last seen a year ago.  Compared to last year he does not feel things have significantly changed.  He continues to work 40 hours a week at [employer]; he is up on his feet most of the time and he has not had any falls.  Remarkably, he does not even use a cane.  He did have a setback about 2 years ago with a sudden weakness of his right leg, but since then things have not changed a lot.  He acknowledges  weakness.  His dysphagia continues to be a problem.  If he swallows water or any other liquids with a quick gulp, he will choke. He can swallow his pills without any difficulties and solid food is not a problem with the exception of very small pieces like rice, which \"tend to go down the wrong pipe\", according to the patient.  He does not use any chin tuck maneuvers or other ways to propel food.  He has not lost any weight.  He does not report any sialorrhea, ptosis or head drop.  He does not have any respiratory concerns.      Medications were reviewed and are as per Epic record.      On physical exam, blood pressure is 115/70, pulse 71 and regular,O2 sat 98% on room air, weight 68 kilos, height is 168 and he endorses no pain.  On neuro exam, things are more or less stable from last year.  He has prominent bilateral facial weakness typical of Be's disease, and " prominent mental fasciculations.  Tongue fasciculations are rather mild and so was tongue atrophy.  Strength of genioglossus and pterygoid are normal.  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  Neck flexion is 4/5, extension is 5.  Deltoids are bilaterally 5.  Biceps are 4.  Triceps right 4+, left 5.  Wrist extension 5.  Bilateral FDI 4.  Bilateral finger extension 4+.  APB right 4+, left 4.  Hip flexion is bilaterally 4.  Quads 5.  Hamstrings right 4-, left 4-.  Foot dorsiflexion is 4+.  Hip abduction is 4 on the right, 5 on the left.      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  We will have him see Physical Therapy and Speech.  It is important that he discusses chin tuck or other maneuvers to facilitate his swallow.  I would not push for a gastrostomy at this point.  In retrospect, the change that he noticed relatively abruptly in late  (sudden weakness of the right thigh) could be due to Be's disease.  I do not have any evidence to support an alternative diagnosis, such as lumbar radiculopathy, or stroke, based on the workup I did for this complaint last year. Follow up in 1 year or sooner if needed. TT spent for patient care 15 minutes; more than half was counseling.           Sincerely,        MD DAVID Carter MD             D: 11/15/2018   T: 11/15/2018   MT: krish      Name:     FRANKE, PAUL   MRN:      [id number]        Account:      [id number]   :      1935           Service Date: 11/15/2018      Document: [id number]

## 2018-11-15 NOTE — MR AVS SNAPSHOT
After Visit Summary   11/15/2018    Paul Allen Franke    MRN: [id number]           Patient Information     Date Of Birth          1935        Visit Information        Provider Department      11/15/2018 10:00 AM Alban Gonzalez MD [facility] Neurology        Today's Diagnoses     Be's disease (H)    -  1       Follow-ups after your visit        Additional Services     OCCUPATIONAL THERAPY REFERRAL       If you have not heard from the scheduling office within 2 business days, please call [phone] for all locations, with the exception of [place], please call [phone] and [place], please call [phone].    Please be aware that coverage of these services is subject to the terms and limitations of your health insurance plan.  Call member services at your health plan with any benefit or coverage questions.            PHYSICAL THERAPY REFERRAL       If you have not heard from the scheduling office within 2 business days, please call [phone] for all locations, with the exception of [place], please call [phone] and [place], please call [phone].    Please be aware that coverage of these services is subject to the terms and limitations of your health insurance plan.  Call member services at your health plan with any benefit or coverage questions.            SPEECH THERAPY REFERRAL       If you have not heard from the scheduling office within 2 business days, please call [phone] for all locations, with the exception of [place], please call [phone] and [place], please call [phone].    Please be aware that coverage of these services is subject to the terms and limitations of your health insurance plan.  Call member services at your health plan with any benefit or coverage questions.                  Your next 10 appointments already scheduled     Nov 14, 2019  9:30 AM CST   (Arrive by 9:15 AM)   Return ALS/Motor Neuron with Alban Randle  "MD Carlos   [facility] Neurology ([facility])    [address] [address] [phone]              Future tests that were ordered for you today     Open Future Orders        Priority Expected Expires Ordered    PHYSICAL THERAPY REFERRAL Routine 11/15/2018 2019 2018    OCCUPATIONAL THERAPY REFERRAL Routine 11/15/2018 2019 2018    SPEECH THERAPY REFERRAL Routine 11/15/2018 2019 2018            Who to contact     Please call your clinic at [phone] to:    Ask questions about your health    Make or cancel appointments    Discuss your medicines    Learn about your test results    Speak to your doctor            Additional Information About Your Visit        MyChart Information     Badoo is an electronic gateway that provides easy, online access to your medical records. With Badoo, you can request a clinic appointment, read your test results, renew a prescription or communicate with your care team.     To sign up for Badoo visit the website at [email or web address]   You will be asked to enter the access code listed below, as well as some personal information. Please follow the directions to create your username and password.     Your access code is: [email or web address]  Expires: 2019  5:31 AM     Your access code will  in 90 days. If you need help or a new code, please contact your [facility] or call [phone] for assistance.        Care EveryWhere ID     This is your Care EveryWhere ID. This could be used by other organizations to access your [place] medical records  [id number]        Your Vitals Were     Pulse Height Pulse Oximetry BMI (Body Mass Index)          71 1.689 m (5' 6.5\") 98% 23.85 kg/m2         Blood Pressure from Last 3 Encounters:   11/15/18 159/70   02/15/18 129/70   17 149/67    Weight from Last 3 Encounters:   11/15/18 68 kg (150 lb) "   02/15/18 69.9 kg (154 lb 3.2 oz)   11/30/17 69.6 kg (153 lb 6.4 oz)               Primary Care Provider Office Phone # Fax #    Casper Ortega [phone]        [facility] [address] [facility] [address]        Equal Access to Services     ALEJANDRA ROMERO : Hadii aad ku hadasho Soomaali, waaxda luqadaha, qaybta kaalmada adeegyada, waxay idiin hayaan adeeg kharash laCraigaan . So [facility] [phone].    ATENCIÓN: Si habla español, tiene a silva disposición servicios gratuitos de asistencia lingüística. Elidia al [phone].    We comply with applicable federal civil rights laws and [place] laws. We do not discriminate on the basis of race, color, national origin, age, disability, sex, sexual orientation, or gender identity.            Thank you!     Thank you for choosing [facility] NEUROLOGY  for your care. Our goal is always to provide you with excellent care. Hearing back from our patients is one way we can continue to improve our services. Please take a few minutes to complete the written survey that you may receive in the mail after your visit with us. Thank you!             Your Updated Medication List - Protect others around you: Learn how to safely use, store and throw away your medicines at www.disposemymeds.org.          This list is accurate as of 11/15/18 10:30 AM.  Always use your most recent med list.                   Brand Name Dispense Instructions for use Diagnosis    aspirin 81 MG tablet      Take 1 tablet by mouth daily.    Cutaneous T-cell lymphoma (H)       clobetasol 0.05 % ointment    TEMOVATE     Apply 1 Application topically daily        diflorasone 0.05 % ointment    PSORCON    180 g    Apply to affected area(s) twice daily    Mycosis fungoides (H), Cutaneous lymphoma (H)       gabapentin 100 MG capsule    NEURONTIN    180 capsule    Take 2 capsules (200 mg) by mouth 3 times daily    Be's disease (H)       lisinopril 2.5 MG tablet    PRINIVIL/Zestril     Take 2.5  mg by mouth daily.        lovastatin 40 MG tablet    MEVACOR     Take 2 tablets by mouth At Bedtime.    Cutaneous T-cell lymphoma (H)       nitroGLYcerin 0.4 MG sublingual tablet    NITROSTAT     Place 0.4 mg under the tongue every 5 minutes as needed.    Cutaneous T-cell lymphoma (H)       oxybutynin 5 MG tablet    DITROPAN     Take 5 mg by mouth 2 times daily        TOPROL XL 50 MG 24 hr tablet   Generic drug:  metoprolol succinate      Take 50 mg by mouth daily.    Cutaneous T-cell lymphoma (H)       triamcinolone 0.1 % ointment    KENALOG     Apply 1 Application topically daily

## 2018-11-15 NOTE — PROGRESS NOTES
"    OUTPATIENT PHYSICAL THERAPY CLINIC NOTE  Franke,Paul Allen  YOB: 1935  [id number]    Type of visit:         Evaluation     Date of service: 11/15/2018    Referring provider: Dr. Gonzalez    Others present at visit:  None    Medical diagnosis:   Be's disease    Date of diagnosis: 1994    Pertinent history of current problem (include personal factors and/or comorbidities that impact the plan of care): Gradual weakness over many years but recent onset of R LE incoordination with 6 falls in Feb 2017.    Cardio-respiratory status:  Forced vital capacity: NT this date    Height/Weight: 5'6\" / 150 lb    Living environment:  Apartment, with daughter     Living environment barriers:  1 stairs to enter (no railing present), to sidewalk out front, able to hold onto stone work   Elevator access in building and at work to breakroom      Current assistance/living environment:  Lives with daughter (she doesn't work, on disability)      Current mobility equipment:  Standard cane(s)- has in home from daughter's prior use, but not using      Current ADL equipment:  Tub/shower combo  Shower chair  Wall grab bar- suction cup     Technology used: NT    Patient concerns/goals: Walk with a slight limp. Tire out fast. Now has a parking sticker to reduce distance of walking. Uses cart pretty much all the time walking at work. Does a lot of liftign of items on/off shelves to cart. Prefers to use tub but cannot get up from tub anymore. Due to skin condition, does not shower often, uses daily cream. CVA workup was negative.    Evaluation   Interview completed.   Pain assessment:  Location: R leg- an irritating sensation vs pain /Rating: at worst: \"it never gets real bad- it's more that it gets tired-er and tired-er\"   Range of motion: Stiffness in B HS R>L    Manual muscle testing: Hip flexion 4+/5 R, 5/5 L, hip abd 4+/5 R, 5/5 L, hip add 5/5 B, knee ext 4+/5 R, 5/5 L, knee flex 4+/5 R, 5/5 L, ankle DF 5/5 B   Gait: " "Pt amb without assistive device with short step length B, reduced R stance time >L, mild waddling gait, torso lean to R in R stance.   Cognition: Intact    *ALS FRS-R (ALS Functional Rating Scale-Revised) completed today. Please see separate note. Total: 37    Fall Risk Screen:   Has the patient fallen 2 or more times in the last year? No      Has the patient fallen and had an injury in the past year? No       25ft walk: 11.56 sec, 17 steps, no AD    Is the patient a fall risk? Yes, department fall risk interventions implemented     Impairments:  Fatigue  Coordination  Balance  Range of motion     Treatment diagnosis:  Impaired mobility  Impaired activities of daily living    Clinical Presentation: Evolving/Changing  Clinical Presentation Rationale: Gradually progressive nature of Be's disease.  Clinical Decision Making (Complexity): Low complexity     Recommendations/Plan of care:  1 session evaluation & treatment.  Equipment recommended: Cane for safe gait outside work, pt notes he has access but declines need at this time.     Goals:   Target date: 11/15/18  Patient, family and/or caregiver will verbalize understanding of evaluation results and implications for functional performance.  Patient, family and/or caregiver will verbalize/demonstrate understanding of compensatory methods /equipment to enhance functional independence and safety.    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 Could potentially have been lumbar spine issue superimposed on Be's disease. Pt notes more of a fatigue issue vs pain but will at times have a \"sensation\" vs pain, may be electric in nature. Encouraged pt be careful and avoid " "lifting items down low d/t stress to lumbar spine as well as falls risk. Pt will tend to do a lateral movement from shelf to cart now vs bending down, notes he's learned tricks to make things easier and safer. 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 Pt notes he enjoys the variety of movement and staying active. Pt does use a cart to walk in store pretty much at all times- either when moving items or will use an empty cart just to walk. He now has a disability parking pass to park closer. Encouraged he could use a cart to walk in from parking lot. Strongly encouraged pt use a cane for to/from work and outside of work. We had previously discussed 4ww which would be similar to cart at work. Pt notes \"when it's necessary\" he'll start to use a cane. States he doesn't know how- education for how to use in L hand opposite of R LE, demonstration provided.   -Encouraged stretching to help with ease of mobility including LE dressing tasks. Pt declines instruction, stating his variety of movement at work is the best thing for him.    Response to treatment/recommendations: Verbalizes understanding of recommendations, voices his preferences    Goal attainment:  All goals met     Risks and benefits of evaluation/treatment have been explained.  Patient, family and/or caregiver are in agreement with Plan of Care.     Timed Code Treatment Minutes: 30  Total Treatment Time (sum of timed and untimed services): 38    Signature: Delaney Soliz, PT   Date: 11/15/2018    Medicare G-code:  Mobility: Walking and Moving Around  Current Status , Goal , Discharge   1 session only, modifier the same for all G-codes.  CK: 40-59% impairment  Modifier determined by clinical judgment in conjunction with objective data and subjective " report.      Certification: BCBS Platinum  Onset date: 11/15/2018  Start of care date: 11/15/2018  Certification date from 11/15/2018 to 11/15/2018    I CERTIFY THE NEED FOR THESE SERVICES FURNISHED UNDER        THIS PLAN OF TREATMENT AND WHILE UNDER MY CARE     (Physician attestation of this document indicates review and certification of the therapy plan).

## 2018-11-15 NOTE — NURSING NOTE
Rooming was not completed as patient was not arrived at check in and nurse advised I needed to put in a room

## 2018-11-15 NOTE — PROGRESS NOTES
Outpatient Speech Language Pathology Neurology Clinic Evaluation  Franke,Paul Allen YOB: 1935 [id number]    Visit Date: 11/15/2018, last seen by clinic SLP 11/30/2017    Age: 83 year old    Medical Diagnosis: Be's Disease    Date of Diagnosis: Long standing    Referring MD: Dr Gonzalez    PMH: Refer to Medical Chart    Fall Risk: Refer to physician report.    Others at Clinic Visit: Alone    Living Situation: Daughter    Patient Concerns/Goals: Increased swallowing difficulty    Observations: Patient pleasant and cooperative, talkative. He reports increased coughing with intake, mostly with liquids but occasionally with rice etc.    Current Mode of Nutrition: Oral diet of regular solids and thin liquids    Weight: 150lbs    Cardio-Respiratory Status: Forced vital capacity: not assessed today, last visit 101%    Functional Rating Scale (ALS-FRS): 37/48, compared to 39/48 last visit      Oral Motor/Swallowing  Oral Motor Function:  Anomalies present:  Labial anomaly- mildly reduced strength and ROM, right greater than left  Velar elevation- severe hypernasal suggests deficits    Volitional Abilities:  Cough- Functional  Throat Clear- Functional  Swallow- Present   Oral Care- BID reports, educated re importance and role in respiratory health    Feeding Assistance: No assistance needed     Swallow Function:   Thin Liquid-  No overt dysphagia or s/s aspiration with trials observed today. He reports coughing with intake of liquids a few times per month which he relates to drinking too much too fast and tipping head back to get last sips out of cup. He also reports he will very rarely cough on rice when he has too much of mixed consistencies in his mouth.     Dysphagia Diagnosis: Mild dysphagia      Speech Intelligibility/Functional Communication   Methods of communication: Verbal    Dysarthria: Mild to moderate    Speech:   Deficits in articulation- minorly decreased precision of  articulation  Deficits in resonance- severely hypernasal  Intelligibility- 100%    Speech Intelligibility/Communication:  Communicates functionally    Augmentative and Alternative Communication (AAC):   Not indicated    Clinical Impression/Plan of Care  Treatment Diagnosis: Oropharyngeal Dysphagia     Recommendations:  Oral diet.  Soft, moist solid with no particulate.  Continue use of compensatory strategies.    Plan of Care:  1 session evaluation and treatment.    Goals: Based on today's evaluation session patient and/or caregiver will have understanding of current communication and/or swallowing status and recommendations for management.    Educational Assessment:  Learners- Patient  Barriers to Learning- No barriers.    Education provided/response:   Speech  Swallowing  Diet  Verbalized understanding    Treatment provided this date:   Swallow intervention, 11 minutes   SLP provided education re swallowing anatomy and physiology and aspiration risks. Instructed him on diet modifications and compensatory strategies to reduce risks including small amounts, slow rate, chin neutral/tuck with intake of liquids. Recommend soft moist solids and thin liquids but with mindful eating and close monitoring for tolerance. He also reports occasional difficulty with thick phlegm; educated re possible causes of natural aging, reduced management due to velopharyngeal insufficiency, and/or poor hydration leading to thicker secretions which are more difficult to manage. He does not wish to pursue any additional treatment or intervention for secretions at this time.    Response to recommendations/treatment: verbalized understanding, asked appropriate questions, agreed to POC    Goal attainment: All goals met    Risks and benefits of evaluation/treatment have been explained.  Patient, family and/or caregiver are in agreement with Plan of Care.    Timed Code Treatment Minutes: 0 minutes    Total Treatment Time (sum of timed and  untimed services): 22 minutes    Medicare G-code:  Swallowing  Swallowing: Current Status , Goal , Discharge   1 session only, modifier the same for all G-codes.  CI: 1-19% impairment  Modifier determined by clinical judgment in conjunction with objective data and subjective report.

## 2018-11-15 NOTE — PROGRESS NOTES
"Service Date: 11/15/2018      November 15, 2018        Casper Ortega MD   [facility]    [address], [address]   [place] [address]      RE: Paul A. Franke    MRN: [id number]   : 1935              Dear Dr. 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  He was last seen a year ago.  Compared to last year he does not feel things have significantly changed.  He continues to work 40 hours a week at [employer]; he is up on his feet most of the time and he has not had any falls.  Remarkably, he does not even use a cane.  He did have a setback about 2 years ago with a sudden weakness of his right leg, but since then things have not changed a lot.  He acknowledges  weakness.  His dysphagia continues to be a problem.  If he swallows water or any other liquids with a quick gulp, he will choke. He can swallow his pills without any difficulties and solid food is not a problem with the exception of very small pieces like rice, which \"tend to go down the wrong pipe\", according to the patient.  He does not use any chin tuck maneuvers or other ways to propel food.  He has not lost any weight.  He does not report any sialorrhea, ptosis or head drop.  He does not have any respiratory concerns.      Medications were reviewed and are as per Epic record.      On physical exam, blood pressure is 115/70, pulse 71 and regular,O2 sat 98% on room air, weight 68 kilos, height is 168 and he endorses no pain.  On neuro exam, things are more or less stable from last year.  He has prominent bilateral facial weakness typical of Be's disease, and prominent mental fasciculations.  Tongue fasciculations are rather mild and so was tongue atrophy.  Strength of genioglossus and pterygoid are normal.  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  Neck flexion is 4/5, extension is 5.  " Deltoids are bilaterally 5.  Biceps are 4.  Triceps right 4+, left 5.  Wrist extension 5.  Bilateral FDI 4.  Bilateral finger extension 4+.  APB right 4+, left 4.  Hip flexion is bilaterally 4.  Quads 5.  Hamstrings right 4-, left 4-.  Foot dorsiflexion is 4+.  Hip abduction is 4 on the right, 5 on the left.      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  We will have him see Physical Therapy and Speech.  It is important that he discusses chin tuck or other maneuvers to facilitate his swallow.  I would not push for a gastrostomy at this point.  In retrospect, the change that he noticed relatively abruptly in late  (sudden weakness of the right thigh) could be due to Be's disease.  I do not have any evidence to support an alternative diagnosis, such as lumbar radiculopathy, or stroke, based on the workup I did for this complaint last year. Follow up in 1 year or sooner if needed. TT spent for patient care 15 minutes; more than half was counseling.           Sincerely,        MD DAVID Carter MD             D: 11/15/2018   T: 11/15/2018   MT: krish      Name:     FRANKE, PAUL   MRN:      [id number]        Account:      [id number]   :      1935           Service Date: 11/15/2018      Document: [id number]

## 2019-01-03 NOTE — PROGRESS NOTES
Certification:  Onset date: 11/15/18  Start of care date: 11/15/2018  Certification date from 11/15/2018 to 11/15/2018    I CERTIFY THE NEED FOR THESE SERVICES FURNISHED UNDER        THIS PLAN OF TREATMENT AND WHILE UNDER MY CARE     (Physician attestation of this document indicates review and certification of the therapy plan).

## 2019-11-13 DIAGNOSIS — G12.1 KENNEDY'S DISEASE (H): Primary | ICD-10-CM

## 2019-11-14 ENCOUNTER — OFFICE VISIT (OUTPATIENT)
Dept: NEUROLOGY | Facility: CLINIC | Age: 84
End: 2019-11-14
Payer: COMMERCIAL

## 2019-11-14 VITALS
WEIGHT: 151 LBS | DIASTOLIC BLOOD PRESSURE: 76 MMHG | OXYGEN SATURATION: 99 % | BODY MASS INDEX: 24.01 KG/M2 | HEART RATE: 63 BPM | SYSTOLIC BLOOD PRESSURE: 146 MMHG

## 2019-11-14 DIAGNOSIS — G12.1 KENNEDY'S DISEASE (H): Primary | ICD-10-CM

## 2019-11-14 ASSESSMENT — ENCOUNTER SYMPTOMS
DIZZINESS: 1
LOSS OF CONSCIOUSNESS: 0
DISTURBANCES IN COORDINATION: 1
JOINT SWELLING: 0
BACK PAIN: 0
SEIZURES: 0
ARTHRALGIAS: 0
NECK PAIN: 0
HEADACHES: 0
SINUS CONGESTION: 1
SINUS PAIN: 0
SKIN CHANGES: 0
MEMORY LOSS: 0
MUSCLE CRAMPS: 1
TASTE DISTURBANCE: 0
SMELL DISTURBANCE: 0
MYALGIAS: 0
SPEECH CHANGE: 0
NAIL CHANGES: 0

## 2019-11-14 ASSESSMENT — PAIN SCALES - GENERAL: PAINLEVEL: NO PAIN (0)

## 2019-11-14 NOTE — LETTER
2019       RE: Paul Allen Franke  [address]  [place] [address]     Dear Colleague,    Thank you for referring your patient, Paul Allen Franke, to the [facility] NEUROLOGY at [facility]. Please see a copy of my visit note below.    Service Date: 2019     Casper Ortega MD   [address], [address]   [place] [address]      RE:      Paul A. Franke    MRN:   [id number]   :   1935           Dear Dr. 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  He was last seen a year ago.  Compared to last year he does not feel things have significantly changed.  He continues to work 40 hours a week at [employer]; he is up on his feet most of the time. He had two falls in the last 12 months, not leading to any serious injury. Remarkably, he does not even use a cane. He acknowledges  weakness.  His dysphagia persists, but it has not worsened from last year. He denies problems with liquids like water, juice, or soup to me today. He says that food like rice or anything with small particles may end up in the wrong tube. He does not use any chin tuck maneuvers or other ways to propel food.  He has not lost any weight.  He does not report any sialorrhea, ptosis or head drop.  He does not have any respiratory concerns.       Medications were reviewed and are as per Epic record.      On physical exam, vitals are: BP (!) 146/76 (BP Location: Left arm, Patient Position: Sitting, Cuff Size: Adult Regular)   Pulse 63   Wt 68.5 kg (151 lb)   SpO2 99%   BMI 24.01 kg/m        On neuro exam, things are more or less stable from last year.  He has prominent bilateral facial weakness typical of Be's disease, and prominent mental fasciculations.  Tongue fasciculations are rather mild and so was tongue atrophy.  Strength of genioglossus  and pterygoid are normal.  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  Neck flexion is 4/5, extension is 5.  Deltoids are right 4+, left 5.  Biceps are 4.  Triceps right 4+, left 5.  Wrist extension 5.  Bilateral FDI 4.  APB right 4+, left 4.  Hip flexion is bilaterally 4.  Quads 5.  Hamstring s 4.  Foot dorsiflexion is 4+.       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 He did not want to see any of our therapists today. I encouraged him to exercise reasonable caution at work; if he feels that the work environment is getting unsafe and falls increase, he should strongly consider retiring. He is determined to continue working as long as he is capable of, and does not want to use any assistive device yet (although I believe it would be reasonable to do so at this point). No treatments are currently available for his condition. I explained to him that this disease generally does not shorten one's life span. Follow up in 1 year or sooner if needed. TT spent for patient care 15 minutes; more than half was counseling.            Sincerely,        Alban Gonzalez MD            D: 11/15/2018   T: 11/15/2018   MT: krish      Name:     FRANKE, PAUL   MRN:      [id number]        Account:      [id number]   :      1935           Service Date: 11/15/2018

## 2019-11-14 NOTE — PROGRESS NOTES
Service Date: 2019     Casper Ortega MD   [facility]    [address]   [place] [address]      RE:      Paul A. Franke    MRN:   [id number]   :   1935           Dear Dr. 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  He was last seen a year ago.  Compared to last year he does not feel things have significantly changed.  He continues to work 40 hours a week at [employer]; he is up on his feet most of the time. He had two falls in the last 12 months, not leading to any serious injury. Remarkably, he does not even use a cane. He acknowledges  weakness.  His dysphagia persists, but it has not worsened from last year. He denies problems with liquids like water, juice, or soup to me today. He says that food like rice or anything with small particles may end up in the wrong tube. He does not use any chin tuck maneuvers or other ways to propel food.  He has not lost any weight.  He does not report any sialorrhea, ptosis or head drop.  He does not have any respiratory concerns.       Medications were reviewed and are as per Epic record.      On physical exam, vitals are: BP (!) 146/76 (BP Location: Left arm, Patient Position: Sitting, Cuff Size: Adult Regular)   Pulse 63   Wt 68.5 kg (151 lb)   SpO2 99%   BMI 24.01 kg/m       On neuro exam, things are more or less stable from last year.  He has prominent bilateral facial weakness typical of Be's disease, and prominent mental fasciculations.  Tongue fasciculations are rather mild and so was tongue atrophy.  Strength of genioglossus and pterygoid are normal.  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  Neck flexion is 4/5, extension is 5.  Deltoids are right 4+, left 5.  Biceps are 4.  Triceps right 4+, left 5.  Wrist extension 5.  Bilateral FDI 4.  APB right 4+,  left 4.  Hip flexion is bilaterally 4.  Quads 5.  Hamstrings 4.  Foot dorsiflexion is 4+.       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 He did not want to see any of our therapists today. I encouraged him to exercise reasonable caution at work; if he feels that the work environment is getting unsafe and falls increase, he should strongly consider retiring. He is determined to continue working as long as he is capable of, and does not want to use any assistive device yet (although I believe it would be reasonable to do so at this point). No treatments are currently available for his condition. I explained to him that this disease generally does not shorten one's life span. Follow up in 1 year or sooner if needed. TT spent for patient care 15 minutes; more than half was counseling.            Sincerely,        MD DAVID Carter MD             D: 11/15/2018   T: 11/15/2018   MT: krish      Name:     FRANKE, PAUL   MRN:      [id number]        Account:      [id number]   :      1935           Service Date: 11/15/2018

## 2020-10-08 ENCOUNTER — VIRTUAL VISIT (OUTPATIENT)
Dept: NEUROLOGY | Facility: CLINIC | Age: 85
End: 2020-10-08
Payer: COMMERCIAL

## 2020-10-08 DIAGNOSIS — G12.1 KENNEDY'S DISEASE (H): Primary | ICD-10-CM

## 2020-10-08 PROCEDURE — 99441 PR PHYSICIAN TELEPHONE EVALUATION 5-10 MIN: CPT | Mod: 95 | Performed by: PSYCHIATRY & NEUROLOGY

## 2020-10-08 NOTE — PROGRESS NOTES
"Paul Allen Franke is a 85 year old male who is being evaluated via a billable telephone visit.      The patient has been notified of following:     \"This telephone visit will be conducted via a call between you and your physician/provider. We have found that certain health care needs can be provided without the need for a physical exam.  This service lets us provide the care you need with a short phone conversation.  If a prescription is necessary we can send it directly to your pharmacy.  If lab work is needed we can place an order for that and you can then stop by our lab to have the test done at a later time.    Telephone visits are billed at different rates depending on your insurance coverage. During this emergency period, for some insurers they may be billed the same as an in-person visit.  Please reach out to your insurance provider with any questions.    If during the course of the call the physician/provider feels a telephone visit is not appropriate, you will not be charged for this service.\"    Patient has given verbal consent for Telephone visit?  yes    What phone number would you like to be contacted at? [phone]    How would you like to obtain your AVS? [email or web address]    Phone call duration: 8 minutes (9.22 am-9.30 am)    Gordon Langley, DINA      Service Date: 10/08/2020     2020      Casper Ortega MD   [address], [address]   [place] [address]      RE:      Paul A. Franke    MRN:   [id number]   :   1935           Dear Dr. Ortega:        I had the pleasure to evaluate Mr Franke via billable telephone visit today. In person visit was not recommended due to ongoing COVID-19 pandemic and patient being high risk for COVID19 related complications (age over 60). 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 He was last seen a year ago.  Compared to last year he feels that his leg strength has slightly worsened. He works up to 30 hours a week " at [employer] in [place] [facility]. He now has to use a cane at times, especially when he has to walk a longer distance, because his right leg tends to fatigue/give way. He has not fallen in the last year. He uses a regular cane but not a 4 point one. He avoids stairs; when he has to go upstairs for lunch at work he takes the elevator. He generally shifts his weight to the left when walking because his right leg is weaker. Regarding dysphagia and dysphonia, those symptoms are not changed compared to a year ago. He knows that he has to avoid big bites of food; as long as he cuts food in smaller pieces, and chews slowly, choking won't happen. Swallowing liquids is not a problem. Strength of upper extremities is unchanged. He denies dyspnea on exertion or orthopnea. He is entitled to at least two 15 minute breaks at work during his shift, but he often takes only the one break!    Medications were reviewed and are as per Epic record.      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 I asked him kindly to talk to our physical therapist today about safety measures, including using the optimal type of cane to prevent falling. He would like to continue working. Upper extremity and bulbar symptoms are stable. No treatments are currently available for his condition. I explained to him that this disease generally does not shorten one's life span. Follow up in 1 year or sooner if needed. TT spent on the phone 8 minutes (9.22 am-9.30 am); more than half was counseling.            Sincerely,        Alban Gonzalez MD

## 2020-10-08 NOTE — LETTER
"10/8/2020       RE: Paul Allen Franke  [address]  [place] [address]     Dear Colleague,    Thank you for referring your patient, Paul Allen Franke, to the [facility] NEUROLOGY CLINIC [place] at [facility]. Please see a copy of my visit note below.    Paul Allen Franke is a 85 year old male who is being evaluated via a billable telephone visit.      The patient has been notified of following:     \"This telephone visit will be conducted via a call between you and your physician/provider. We have found that certain health care needs can be provided without the need for a physical exam.  This service lets us provide the care you need with a short phone conversation.  If a prescription is necessary we can send it directly to your pharmacy.  If lab work is needed we can place an order for that and you can then stop by our lab to have the test done at a later time.    Telephone visits are billed at different rates depending on your insurance coverage. During this emergency period, for some insurers they may be billed the same as an in-person visit.  Please reach out to your insurance provider with any questions.    If during the course of the call the physician/provider feels a telephone visit is not appropriate, you will not be charged for this service.\"    Patient has given verbal consent for Telephone visit?  yes    What phone number would you like to be contacted at? [phone]    How would you like to obtain your AVS? [facility]    Phone call duration: 8 minutes (9.22 am-9.30 am)    DINA Ortez      Service Date: 10/08/2020     2020      Casper Ortega MD   [facility]    [address] [place] [address], [address]   [place] [address]      RE:      Paul A. Franke    MRN:   [id number]   :   1935           Dear Dr. Ortega:        I had the pleasure to evaluate Mr Franke via billable telephone visit today. In person visit was " not recommended due to ongoing COVID-19 pandemic and patient being high risk for COVID19 related complications (age over 60). 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 He was last seen a year ago.  Compared to last year he feels that his leg strength has slightly worsened. He works up to 30 hours a week at [employer] in [facility]. He now has to use a cane at times, especially when he has to walk a longer distance, because his right leg tends to fatigue/give way. He has not fallen in the last year. He uses a regular cane but not a 4 point one. He avoids stairs; when he has to go upstairs for lunch at work he takes the elevator. He generally shifts his weight to the left when walking because his right leg is weaker. Regarding dysphagia and dysphonia, those symptoms are not changed compared to a year ago. He knows that he has to avoid big bites of food; as long as he cuts food in smaller pieces, and chews slowly, choking won't happen. Swallowing liquids is not a problem. Strength of upper extremities is unchanged. He denies dyspnea on exertion or orthopnea. He is entitled to at least two 15 minute breaks at work during his shift, but he often takes only the one break!    Medications were reviewed and are as per Epic record.      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 I asked him kindly to talk to our physical therapist today about safety measures, including using the optimal type of cane to prevent falling. He would like to continue working. Upper extremity and bulbar symptoms are stable. No treatments are currently available for his condition. I explained to him that this disease generally does not shorten one's life span. Follow up in 1 year or sooner if needed. TT spent on the phone 8 minutes (9.22 am-9.30 am); more than half was counseling.            Sincerely,        Alban Gonzalez MD                                 Again, thank you for allowing me to participate in the care of your patient.      Sincerely,    Alban Gonzalez MD

## 2020-10-08 NOTE — LETTER
10/8/2020       RE: Paul Allen Franke  [address]  [place] [address]     Dear Colleague,    Thank you for referring your patient, Paul Allen Franke, to the [facility] NEUROLOGY CLINIC [place] at [facility]. Please see a copy of my visit note below.    Service Date: 10/08/2020     Casper Ortega MD   [place] [id number], [address]   [place] [address]      RE:      Paul A. Franke    MRN:   [id number]   :   1935           Dear Dr. Ortega:  I had the pleasure to evaluate Mr Franke via billable telephone visit today. In person visit was not recommended due to ongoing COVID-19 pandemic and patient being high risk for COVID19 related complications (age over 60). 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 He was last seen a year ago.  Compared to last year he feels that his leg strength has slightly worsened. He works up to 30 hours a week at [employer] in [employer]. He now has to use a cane at times, especially when he has to walk a longer distance, because his right leg tends to fatigue/give way. He has not fallen in the last year. He uses a regular cane but not a 4 point one. He avoids stairs; when he has to go upstairs for lunch at work he takes the elevator. He generally shifts his weight to the left when walking because his right leg is weaker. Regarding dysphagia and dysphonia, those symptoms are not changed compared to a year ago. He knows that he has to avoid big bites of food; as long as he cuts food in smaller pieces, and chews slowly, choking won't happen. Swallowing liquids is not a problem. Strength of upper extremities is unchanged. He denies dyspnea on exertion or orthopnea. He is entitled to at least two 15 minute breaks at work during his shift, but he often takes only the one break!    Medications were reviewed and are as per Epic record.      In summary, Mr. Franke has genetically confirmed  Be's disease and has had expected mild and slow progression of his leg weakness in the last year. I asked him kindly to talk to our physical therapist today about safety measures, including using the optimal type of cane to prevent falling. He would like to continue working. Upper extremity and bulbar symptoms are stable. No treatments are currently available for his condition. I explained to him that this disease generally does not shorten one's life span. Follow up in 1 year or sooner if needed. TT spent on the phone 8 minutes (9.22 am-9.30 am); more than half was counseling.          Again, thank you for allowing me to participate in the care of your patient.  Sincerely,    Alban Gonzalez MD

## 2021-03-28 ENCOUNTER — HEALTH MAINTENANCE LETTER (OUTPATIENT)
Age: 86
End: 2021-03-28

## 2021-07-19 DIAGNOSIS — G12.1 KENNEDY'S DISEASE (H): Primary | ICD-10-CM

## 2021-07-20 ASSESSMENT — ENCOUNTER SYMPTOMS
BOWEL INCONTINENCE: 1
NERVOUS/ANXIOUS: 1
SINUS CONGESTION: 1
TROUBLE SWALLOWING: 1
MEMORY LOSS: 1
DEPRESSION: 1
SPEECH CHANGE: 1
INSOMNIA: 1
HEARTBURN: 1
TREMORS: 1

## 2021-07-22 ENCOUNTER — OFFICE VISIT (OUTPATIENT)
Dept: NEUROLOGY | Facility: CLINIC | Age: 86
End: 2021-07-22
Payer: COMMERCIAL

## 2021-07-22 VITALS
RESPIRATION RATE: 16 BRPM | DIASTOLIC BLOOD PRESSURE: 63 MMHG | HEART RATE: 61 BPM | OXYGEN SATURATION: 96 % | SYSTOLIC BLOOD PRESSURE: 131 MMHG

## 2021-07-22 DIAGNOSIS — G12.1 KENNEDY'S DISEASE (H): ICD-10-CM

## 2021-07-22 DIAGNOSIS — R44.1 VISUAL HALLUCINATION: ICD-10-CM

## 2021-07-22 DIAGNOSIS — S72.351D CLOSED DISPLACED COMMINUTED FRACTURE OF SHAFT OF RIGHT FEMUR WITH ROUTINE HEALING: ICD-10-CM

## 2021-07-22 DIAGNOSIS — G45.9 TIA (TRANSIENT ISCHEMIC ATTACK): Primary | ICD-10-CM

## 2021-07-22 DIAGNOSIS — R55 SYNCOPE, UNSPECIFIED SYNCOPE TYPE: ICD-10-CM

## 2021-07-22 PROCEDURE — 99215 OFFICE O/P EST HI 40 MIN: CPT | Performed by: PSYCHIATRY & NEUROLOGY

## 2021-07-22 RX ORDER — FUROSEMIDE 40 MG
40 TABLET ORAL
COMMUNITY

## 2021-07-22 RX ORDER — SULFAMETHOXAZOLE/TRIMETHOPRIM 800-160 MG
TABLET ORAL EVERY 12 HOURS
COMMUNITY

## 2021-07-22 RX ORDER — METOPROLOL TARTRATE 25 MG/1
TABLET, FILM COATED ORAL
COMMUNITY

## 2021-07-22 ASSESSMENT — PAIN SCALES - GENERAL: PAINLEVEL: NO PAIN (0)

## 2021-07-22 NOTE — LETTER
2021       RE: Paul Allen Franke  [address]  [place] [address]     Dear Colleague,    Thank you for referring your patient, Paul Allen Franke, to the [facility] NEUROLOGY CLINIC [place] at [facility]. Please see a copy of my visit note below.    Service Date: 2021    Casper Ortega MD   [facility]  [address] [place] [address]    RE:      Paul A. Franke   MRN:  [id number]  :   1935    Dear Dr. Ortega:    I had the pleasure to see Paul Franke at the [facility] for his Be disease.  I last saw him by virtual visit in 10/2020 and in person in 2019.  The last 9 months have been eventful. Kuldeep has an increasing frequency of falls. On 2021 he ended up falling from a height at home, which resulted in a right comminuted femoral fracture requiring intramedullary nailing.  He described to me what happened during that fall.  It was about the time that he would go to bed.  He was up naked, and he had a visual illusion of his legs looking like colored ribbons on both sides.  He cannot recall what exactly happened. After that illusion, his legs felt weak, and he ended up on the floor.  He has had a couple more falls thereafter, one of them resulting in an L1 compression fracture. He was until a few days ago in a TCU, where he spent 3 weeks in 2021. He will continue physical therapy for at least 1-2 more months.  He is using a walker at all times now to ambulate.  He used to work at [employer] until last year 6 hours a day.      His dysphagia is stable.  He occasionally will have difficulty swallowing something solid that can get stuck or a rare episode of choking, but they have not increased in frequency.  His voice and speech are a bit nasal, but they have not worsened lately either. He does not have sialorrhea or major difficulty clearing secretions.  He denies  "dyspnea on exertion or orthopnea.  His arm strength is about the same.      Following the episode in April 2021, he was recommended to see Cardiology, but this has not happened yet.  He had a head CT on 06/2021 showing no acute intracranial abnormality and extensive microvascular disease.  He had an EKG on 06/30, showing sinus rhythm with occasional ectopic premature complexes.  Echocardiogram was done on 04/21 showing no significant valvular abnormalities.  Left ventricular EF was more than 70% with hyperdynamic function.  There was concentric left ventricular hypertrophy.  The right ventricle was normal.      Again regarding the event leading to his femur fracture, it was mentioned as \"blackout\" by a few providers, but it is not entirely clear from the patient's description that there was true loss of consciousness. He has not had those visual illusions again, however. He did have an Ophthalmology assessment at the beginning of the year with no major findings, but he was not seen by his eye doctor after this episode.      MEDICATIONS:  Reviewed and are as per Epic record.    /63   Pulse 61   Resp 16   SpO2 96%      NEUROLOGIC EXAMINATION:  Kuldeep is in good spirits.  His neck flexion is 4-/5; extension is full.  He has a hypernasal voice and dysarthria as is typical for Be. His strength is 4+ for shoulder abduction. Biceps are bilaterally 4.  Triceps right 4-, left nearly 5.  Wrist extensors bilaterally 5.  Finger extensors 5. FDIs bilaterally 4.  APBs about 4-. In the legs, hip flexion is weaker on the right compared to the left.  On the right, it is about a 3; on the left, it is 4-.  Knee extension bilaterally 5.  Knee flexion right 3, left 4.  Foot dorsiflexion right 4-, left 4.  Tone is normal.    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  I told him that I do not endorse him " getting back to [employer] to work now.  He needs to continue working with PT, and depending on his progress, we will reassess this in the next 2-3 months.  For now, I would recommend using a walker at all times to minimize the risk of falls.  Unfortunately, there is no disease-modifying treatment available for Be disease.    What happened in 04/2021 remains puzzling to me. 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  The differential diagnosis for this is extensive. Those could be hypnagogic visual hallucinations, which are usually benign.  However, one should also consider TIA, stroke, seizures or syncopal episode.  There could be also retinal or vitreal pathology in the eyes explaining them. I asked the patient to see his eye doctor again ASAP.  He needs a Cardiology referral, and we will refer him to the [facility] for this purpose.  I will get an MRI/MRA of the brain with stroke protocol and an EEG.  I will communicate the results when they are available.  Kuldeep will return to clinic for followup in 6 months or sooner if needed.    Total time spent on this visit today was 45 minutes, including 20 minutes face to face with the patient, 10 minutes in post visit note dictation, editing and orders and at least 15 minutes in pre-visit chart review.    Alban Gonzalez MD      Again, thank you for allowing me to participate in the care of your patient.      Sincerely,    Alban Gonzalez MD

## 2021-07-22 NOTE — PROGRESS NOTES
Service Date: 2021    Casper Ortega MD   [facility]  [address] [place] [address]    RE:      Paul A. Franke   MRN:  [id number]  :   1935    Dear Dr. 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  I last saw him by virtual visit in 10/2020 and in person in 2019.  The last 9 months have been eventful. Kuldeep has an increasing frequency of falls. On 2021 he ended up falling from a height at home, which resulted in a right comminuted femoral fracture requiring intramedullary nailing.  He described to me what happened during that fall.  It was about the time that he would go to bed.  He was up naked, and he had a visual illusion of his legs looking like colored ribbons on both sides.  He cannot recall what exactly happened. After that illusion, his legs felt weak, and he ended up on the floor.  He has had a couple more falls thereafter, one of them resulting in an L1 compression fracture. He was until a few days ago in a TCU, where he spent 3 weeks in 2021. He will continue physical therapy for at least 1-2 more months.  He is using a walker at all times now to ambulate.  He used to work at [employer] until last year 6 hours a day.      His dysphagia is stable.  He occasionally will have difficulty swallowing something solid that can get stuck or a rare episode of choking, but they have not increased in frequency.  His voice and speech are a bit nasal, but they have not worsened lately either. He does not have sialorrhea or major difficulty clearing secretions.  He denies dyspnea on exertion or orthopnea.  His arm strength is about the same.      Following the episode in 2021, he was recommended to see Cardiology, but this has not happened yet.  He had a head CT on 2021 showing no acute intracranial abnormality and extensive microvascular disease.  He had an EKG on , showing sinus  "rhythm with occasional ectopic premature complexes.  Echocardiogram was done on 04/21 showing no significant valvular abnormalities.  Left ventricular EF was more than 70% with hyperdynamic function.  There was concentric left ventricular hypertrophy.  The right ventricle was normal.      Again regarding the event leading to his femur fracture, it was mentioned as \"blackout\" by a few providers, but it is not entirely clear from the patient's description that there was true loss of consciousness. He has not had those visual illusions again, however. He did have an Ophthalmology assessment at the beginning of the year with no major findings, but he was not seen by his eye doctor after this episode.      MEDICATIONS:  Reviewed and are as per Epic record.    /63   Pulse 61   Resp 16   SpO2 96%      NEUROLOGIC EXAMINATION:  Kuldeep is in good spirits.  His neck flexion is 4-/5; extension is full.  He has a hypernasal voice and dysarthria as is typical for Be. His strength is 4+ for shoulder abduction. Biceps are bilaterally 4.  Triceps right 4-, left nearly 5.  Wrist extensors bilaterally 5.  Finger extensors 5. FDIs bilaterally 4.  APBs about 4-. In the legs, hip flexion is weaker on the right compared to the left.  On the right, it is about a 3; on the left, it is 4-.  Knee extension bilaterally 5.  Knee flexion right 3, left 4.  Foot dorsiflexion right 4-, left 4.  Tone is normal.    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  I told him that I do not endorse him getting back to [employer] to work now.  He needs to continue working with PT, and depending on his progress, we will reassess this in the next 2-3 months.  For now, I would recommend using a walker at all times to minimize the risk of falls.  Unfortunately, there is no disease-modifying treatment available for Be disease.    What " happened in 2021 remains puzzling to me. 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  The differential diagnosis for this is extensive. Those could be hypnagogic visual hallucinations, which are usually benign.  However, one should also consider TIA, stroke, seizures or syncopal episode.  There could be also retinal or vitreal pathology in the eyes explaining them. I asked the patient to see his eye doctor again ASAP.  He needs a Cardiology referral, and we will refer him to the [facility] for this purpose.  I will get an MRI/MRA of the brain with stroke protocol and an EEG.  I will communicate the results when they are available.  Negar will return to clinic for followup in 6 months or sooner if needed.    Total time spent on this visit today was 45 minutes, including 20 minutes face to face with the patient, 10 minutes in post visit note dictation, editing and orders and at least 15 minutes in pre-visit chart review.    Alban Gonzalez MD        D: 2021   T: 2021   MT: jose    Name:     FRANKE, PAUL A.  MRN:      [id number]        Account:      [id number]   :      1935           Service Date: 2021       Document: [id number]

## 2021-07-22 NOTE — PATIENT INSTRUCTIONS
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    You need to see your eye doctor again  Referral to Cardiology today (at the )  Brain MRI to evaluate for stroke/TIA  EEG to evaluate for seizures    Continue physical therapy as you currently do    Please call [facility] @ [phone] for questions or concerns during regular business hours. For a more efficient way to communicate, use [place] and address the message to your physician. Remember, [place] is only read during business hours. Do not leave urgent messages on voicemail or [place]. If situation is urgent, contact the Neurology Clinic @ [phone] and ask to speak to a Triage Nurse or Call 911 or visit an Emergency Department.    Please call your pharmacy if you need a medication refill. They will send us an electronic message.

## 2021-07-23 NOTE — TELEPHONE ENCOUNTER
Action    Action Taken 7-23: Requested from NM:    EKG Strips    Echo    6-30-21, 6-7-21, 4-23-21, 1-6-21 4-25-21

## 2021-07-30 ENCOUNTER — TELEPHONE (OUTPATIENT)
Dept: NEUROLOGY | Facility: CLINIC | Age: 86
End: 2021-07-30

## 2021-07-30 DIAGNOSIS — R40.4 TRANSIENT ALTERATION OF AWARENESS: Primary | ICD-10-CM

## 2021-07-30 NOTE — TELEPHONE ENCOUNTER
Patient daughter called [facility] today about upcoming EEG Dr. Gonzalez ordered. Patient has decided he would like to be sedated for it. Please follow up with Daughter Sheila at [phone]. Okay to leave a message.    Routed to [facility] Neurology Nurses

## 2021-08-02 DIAGNOSIS — F41.9 ANXIETY: Primary | ICD-10-CM

## 2021-08-02 RX ORDER — LORAZEPAM 0.5 MG/1
0.5 TABLET ORAL SEE ADMIN INSTRUCTIONS
Qty: 3 TABLET | Refills: 0 | Status: SHIPPED | OUTPATIENT
Start: 2021-08-02

## 2021-08-09 ENCOUNTER — OFFICE VISIT (OUTPATIENT)
Dept: CARDIOLOGY | Facility: CLINIC | Age: 86
End: 2021-08-09
Attending: PSYCHIATRY & NEUROLOGY
Payer: COMMERCIAL

## 2021-08-09 ENCOUNTER — PRE VISIT (OUTPATIENT)
Dept: CARDIOLOGY | Facility: CLINIC | Age: 86
End: 2021-08-09

## 2021-08-09 VITALS
OXYGEN SATURATION: 96 % | BODY MASS INDEX: 24.28 KG/M2 | HEART RATE: 65 BPM | HEIGHT: 67 IN | SYSTOLIC BLOOD PRESSURE: 113 MMHG | WEIGHT: 154.7 LBS | DIASTOLIC BLOOD PRESSURE: 62 MMHG

## 2021-08-09 DIAGNOSIS — G45.9 TIA (TRANSIENT ISCHEMIC ATTACK): ICD-10-CM

## 2021-08-09 DIAGNOSIS — R55 SYNCOPE, UNSPECIFIED SYNCOPE TYPE: Primary | ICD-10-CM

## 2021-08-09 DIAGNOSIS — R40.4 TRANSIENT ALTERATION OF AWARENESS: Primary | ICD-10-CM

## 2021-08-09 PROCEDURE — 99204 OFFICE O/P NEW MOD 45 MIN: CPT | Performed by: INTERNAL MEDICINE

## 2021-08-09 PROCEDURE — G0463 HOSPITAL OUTPT CLINIC VISIT: HCPCS | Mod: 25

## 2021-08-09 PROCEDURE — 93005 ELECTROCARDIOGRAM TRACING: CPT

## 2021-08-09 RX ORDER — OXYCODONE HYDROCHLORIDE 5 MG/1
5 TABLET ORAL PRN
COMMUNITY
Start: 2021-05-27

## 2021-08-09 ASSESSMENT — MIFFLIN-ST. JEOR: SCORE: 1350.1

## 2021-08-09 ASSESSMENT — PAIN SCALES - GENERAL: PAINLEVEL: NO PAIN (0)

## 2021-08-09 NOTE — NURSING NOTE
Chief Complaint   Patient presents with     Follow Up     syncopal episode with visual illusions      Vitals were taken and medications where reconciled. EKG was performed   DINA Boyd  2:07 PM

## 2021-08-09 NOTE — PROGRESS NOTES
"HPI:  Mr. 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  He was referred for a cardiology evaluation.  He fell a total of 3 times in 4 and 5/2021.  He had visual illusion 2 times before he fell, but he had no vison problems after he woke up.  Since then he has had no events.  He denied any chest pain ,SOB or palpitation.    PAST MEDICAL HISTORY:  No past medical history on file.    CURRENT MEDICATIONS:  Current Outpatient Medications   Medication Sig Dispense Refill     aspirin 81 MG tablet Take 1 tablet by mouth daily.       furosemide (LASIX) 40 MG tablet Take 40 mg by mouth       gabapentin (NEURONTIN) 100 MG capsule Take 2 capsules (200 mg) by mouth 3 times daily 180 capsule 5     lisinopril (PRINIVIL,ZESTRIL) 2.5 MG tablet Take 2.5 mg by mouth daily.       lovastatin (MEVACOR) 40 MG tablet Take 2 tablets by mouth At Bedtime.       metoprolol tartrate (LOPRESSOR) 25 MG tablet metoprolol tartrate 25 mg tablet   1 tablet 2x perday       oxybutynin (DITROPAN) 5 MG tablet Take 5 mg by mouth 2 times daily       oxyCODONE (ROXICODONE) 5 MG tablet Take 5 mg by mouth as needed Pt taking 0.5tab       clobetasol (TEMOVATE) 0.05 % ointment Apply 1 Application topically daily (Patient not taking: Reported on 8/9/2021)       diflorasone (PSORCON) 0.05 % ointment Apply to affected area(s) twice daily (Patient not taking: Reported on 8/9/2021) 180 g 0     LORazepam (ATIVAN) 0.5 MG tablet Take 1 tablet (0.5 mg) by mouth See Admin Instructions (Patient not taking: Reported on 8/9/2021) 3 tablet 0     metoprolol (TOPROL XL) 50 MG 24 hr tablet Take 50 mg by mouth daily.       nitroGLYCERIN (NITROSTAT) 0.4 MG SL tablet Place 0.4 mg under the tongue every 5 minutes as needed. (Patient not taking: Reported on 8/9/2021)       sulfamethoxazole-trimethoprim (BACTRIM DS) 800-160 MG tablet every 12 hours (Patient not taking: Reported " "on 2021)       triamcinolone (KENALOG) 0.1 % ointment Apply 1 Application topically daily (Patient not taking: Reported on 2021)         PAST SURGICAL HISTORY:  No past surgical history on file.    ALLERGIES:     Allergies   Allergen Reactions     Sulfa Drugs GI Disturbance     Morphine      Other reaction(s): Vomiting       FAMILY HISTORY:  + Premature coronary artery disease  - Atrial fibrillation  - Sudden cardiac death     SOCIAL HISTORY:  Social History     Tobacco Use     Smoking status: Former Smoker     Types: Pipe     Quit date: 1972     Years since quittin.6     Smokeless tobacco: Never Used   Substance Use Topics     Alcohol use: No     Drug use: No       ROS:   Constitutional: No fever, chills, or sweats. Weight stable.   ENT: No visual disturbance, ear ache, epistaxis, sore throat.   Cardiovascular: As per HPI.   Respiratory: No cough, hemoptysis.    GI: No nausea, vomiting, hematemesis, melena, or hematochezia.   : No hematuria.   Integument: Negative.   Psychiatric: Negative.   Hematologic:  Easy bruising, no easy bleeding.  Neuro: Negative.   Endocrinology: No significant heat or cold intolerance   Musculoskeletal: No myalgia.    Exam:  /62 (BP Location: Right arm, Patient Position: Supine, Cuff Size: Adult Small)   Pulse 65   Ht 1.709 m (5' 7.3\")   Wt 70.2 kg (154 lb 11.2 oz)   SpO2 96%   BMI 24.01 kg/m    GENERAL APPEARANCE: healthy, alert and no distress  HEENT: no icterus, no xanthelasmas, normal pupil size and reaction, normal palate, mucosa moist, no central cyanosis  NECK: no adenopathy, no asymmetry, masses, or scars, thyroid normal to palpation and no bruits, JVP not elevated  RESPIRATORY: lungs clear to auscultation - no rales, rhonchi or wheezes, no use of accessory muscles, no retractions, respirations are unlabored, normal respiratory rate  CARDIOVASCULAR: regular rhythm, normal S1 with physiologic split S2, no S3 or S4 and no murmur, click or rub, " "precordium quiet with normal PMI.  ABDOMEN: soft, non tender, without hepatosplenomegaly, no masses palpable, bowel sounds normal, aorta not enlarged by palpation, no abdominal bruits  EXTREMITIES: peripheral pulses normal, no edema, no bruits  NEURO: alert and oriented to person/place/time, normal speech, gait and affect  VASC: Radial, femoral, dorsalis pedis and posterior tibialis pulses are normal in volumes and symmetric bilaterally. No bruits are heard.  SKIN: no ecchymoses, no rashes    Labs:  CBC RESULTS:   Lab Results   Component Value Date    WBC 7.1 07/13/2012    RBC 4.93 07/13/2012    HGB 15.1 07/13/2012    HCT 45.1 07/13/2012    MCV 92 07/13/2012    MCH 30.6 07/13/2012    MCHC 33.5 07/13/2012    RDW 14.1 07/13/2012     07/13/2012       BMP RESULTS:  No results found for: NA, POTASSIUM, CHLORIDE, CO2, ANIONGAP, GLC, BUN, CR, GFRESTIMATED, GFRESTBLACK, CHRIS     INR RESULTS:  No results found for: INR    Procedures:  ECG on 8/9/2021: Reviewed.  WNL.    TTE on 11/30/2021: Reviewed.  Interpretation Summary  Global and regional left ventricular function is normal with an EF of 55-60%.  The right ventricle is normal size. Global right ventricular function is  normal.  Both atria appear normal.  No evidence of a right to left shunt by bubble study.  The inferior vena cava was normal in size with preserved respiratory  variability. Estimated mean right atrial pressure is 3 mmHg.  No pericardial effusion is present.    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  He fell a total of 3 times in 4 and 5/2021.  He had visual illusion 2 times before he fell, but he had no vison problems after he woke up.  Since then he has had no events for the past 2 months.    Vaso-vagal syncope? Arrhythmias?  Arrhythmias cannot be ruled out, but he has had no events for the past 2 months, and a wearable event monitor would not " work.  I advised him to take more fluid and see how it works because he is likely to be dehydrated.  No regular follow up will be required.    I spent a total of 45 min today to review the records, see the patient, and complete the documents.    CC  Patient Care Team:  Casper Ortega as PCP - General  Alban Goff MD as MD (Neurology)  Amanda Jefferson, PATRICIA as Continuity Care Coordinator (Nurse)  Alban Goff MD as Assigned Neuroscience Provider  Merly Lopez MD (Cardiovascular Disease)  Alban Goff MD as Referring Physician (Neurology)  ALBAN GOFF

## 2021-08-09 NOTE — LETTER
"8/9/2021      RE: Paul Allen Franke  [address]  [place] [address]       Dear Colleague,    Thank you for the opportunity to participate in the care of your patient, Paul Allen Franke, at the [facility] [place] [facility]. Please see a copy of my visit note below.    HPI:  Mr. 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  He was referred for a cardiology evaluation.  He fell a total of 3 times in 4 and 5/2021.  He had visual illusion 2 times before he fell, but he had no vison problems after he woke up.  Since then he has had no events.  He denied any chest pain ,SOB or palpitation.    PAST MEDICAL HISTORY:  No past medical history on file.    CURRENT MEDICATIONS:  Current Outpatient Medications   Medication Sig Dispense Refill     aspirin 81 MG tablet Take 1 tablet by mouth daily.       furosemide (LASIX) 40 MG tablet Take 40 mg by mouth       gabapentin (NEURONTIN) 100 MG capsule Take 2 capsules (200 mg) by mouth 3 times daily 180 capsule 5     lisinopril (PRINIVIL,ZESTRIL) 2.5 MG tablet Take 2.5 mg by mouth daily.       lovastatin (MEVACOR) 40 MG tablet Take 2 tablets by mouth At Bedtime.       metoprolol tartrate (LOPRESSOR) 25 MG tablet metoprolol tartrate 25 mg tablet   1 tablet 2x perday       oxybutynin (DITROPAN) 5 MG tablet Take 5 mg by mouth 2 times daily       oxyCODONE (ROXICODONE) 5 MG tablet Take 5 mg by mouth as needed Pt taking 0.5tab       clobetasol (TEMOVATE) 0.05 % ointment Apply 1 Application topically daily (Patient not taking: Reported on 8/9/2021)       diflorasone (PSORCON) 0.05 % ointment Apply to affected area(s) twice daily (Patient not taking: Reported on 8/9/2021) 180 g 0     LORazepam (ATIVAN) 0.5 MG tablet Take 1 tablet (0.5 mg) by mouth See Admin Instructions (Patient not taking: Reported on " "2021) 3 tablet 0     metoprolol (TOPROL XL) 50 MG 24 hr tablet Take 50 mg by mouth daily.       nitroGLYCERIN (NITROSTAT) 0.4 MG SL tablet Place 0.4 mg under the tongue every 5 minutes as needed. (Patient not taking: Reported on 2021)       sulfamethoxazole-trimethoprim (BACTRIM DS) 800-160 MG tablet every 12 hours (Patient not taking: Reported on 2021)       triamcinolone (KENALOG) 0.1 % ointment Apply 1 Application topically daily (Patient not taking: Reported on 2021)         PAST SURGICAL HISTORY:  No past surgical history on file.    ALLERGIES:     Allergies   Allergen Reactions     Sulfa Drugs GI Disturbance     Morphine      Other reaction(s): Vomiting       FAMILY HISTORY:  + Premature coronary artery disease  - Atrial fibrillation  - Sudden cardiac death     SOCIAL HISTORY:  Social History     Tobacco Use     Smoking status: Former Smoker     Types: Pipe     Quit date: 1972     Years since quittin.6     Smokeless tobacco: Never Used   Substance Use Topics     Alcohol use: No     Drug use: No       ROS:   Constitutional: No fever, chills, or sweats. Weight stable.   ENT: No visual disturbance, ear ache, epistaxis, sore throat.   Cardiovascular: As per HPI.   Respiratory: No cough, hemoptysis.    GI: No nausea, vomiting, hematemesis, melena, or hematochezia.   : No hematuria.   Integument: Negative.   Psychiatric: Negative.   Hematologic:  Easy bruising, no easy bleeding.  Neuro: Negative.   Endocrinology: No significant heat or cold intolerance   Musculoskeletal: No myalgia.    Exam:  /62 (BP Location: Right arm, Patient Position: Supine, Cuff Size: Adult Small)   Pulse 65   Ht 1.709 m (5' 7.3\")   Wt 70.2 kg (154 lb 11.2 oz)   SpO2 96%   BMI 24.01 kg/m    GENERAL APPEARANCE: healthy, alert and no distress  HEENT: no icterus, no xanthelasmas, normal pupil size and reaction, normal palate, mucosa moist, no central cyanosis  NECK: no adenopathy, no asymmetry, masses, or " "scars, thyroid normal to palpation and no bruits, JVP not elevated  RESPIRATORY: lungs clear to auscultation - no rales, rhonchi or wheezes, no use of accessory muscles, no retractions, respirations are unlabored, normal respiratory rate  CARDIOVASCULAR: regular rhythm, normal S1 with physiologic split S2, no S3 or S4 and no murmur, click or rub, precordium quiet with normal PMI.  ABDOMEN: soft, non tender, without hepatosplenomegaly, no masses palpable, bowel sounds normal, aorta not enlarged by palpation, no abdominal bruits  EXTREMITIES: peripheral pulses normal, no edema, no bruits  NEURO: alert and oriented to person/place/time, normal speech, gait and affect  VASC: Radial, femoral, dorsalis pedis and posterior tibialis pulses are normal in volumes and symmetric bilaterally. No bruits are heard.  SKIN: no ecchymoses, no rashes    Labs:  CBC RESULTS:   Lab Results   Component Value Date    WBC 7.1 07/13/2012    RBC 4.93 07/13/2012    HGB 15.1 07/13/2012    HCT 45.1 07/13/2012    MCV 92 07/13/2012    MCH 30.6 07/13/2012    MCHC 33.5 07/13/2012    RDW 14.1 07/13/2012     07/13/2012       BMP RESULTS:  No results found for: NA, POTASSIUM, CHLORIDE, CO2, ANIONGAP, GLC, BUN, CR, GFRESTIMATED, GFRESTBLACK, CHRIS     INR RESULTS:  No results found for: INR    Procedures:  ECG on 8/9/2021: Reviewed.  WNL.    TTE on 11/30/2021: Reviewed.  Interpretation Summary  Global and regional left ventricular function is normal with an EF of 55-60%.  The right ventricle is normal size. Global right ventricular function is  normal.  Both atria appear normal.  No evidence of a right to left shunt by bubble study.  The inferior vena cava was normal in size with preserved respiratory  variability. Estimated mean right atrial pressure is 3 mmHg.  No pericardial effusion is present.    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  He fell " a total of 3 times in 4 and 5/2021.  He had visual illusion 2 times before he fell, but he had no vison problems after he woke up.  Since then he has had no events for the past 2 months.    Vaso-vagal syncope? Arrhythmias?  Arrhythmias cannot be ruled out, but he has had no events for the past 2 months, and a wearable event monitor would not work.  I advised him to take more fluid and see how it works because he is likely to be dehydrated.  No regular follow up will be required.    I spent a total of 45 min today to review the records, see the patient, and complete the documents.    CC  Patient Care Team:  Casper Ortega as PCP - General  Alban Gonzalez MD as MD (Neurology)  Amanda Jefferson RN as Continuity Care Coordinator (Nurse)  Alban Gonzalez MD as Assigned Neuroscience Provider  Merly Lopez MD (Cardiovascular Disease)        Please do not hesitate to contact me if you have any questions/concerns.     Sincerely,     Merly Lopez MD

## 2021-08-09 NOTE — PATIENT INSTRUCTIONS
You were seen in the Electrophysiology Clinic today by: Dr. Lopez    Plan:       Follow up visit as needed        Your Care Team:  EP Cardiology   Telephone Number     Nurse Line [phone]     For scheduling appts or procedures:    Gege Guajardo   [phone]   For the Device Clinic (Pacemakers, ICDs, Loop Recorders)    During business hours: [phone]  After business hours:   [phone]- select option 4 and ask for job code [id number].     On-call cardiologist for after hours or on weekends: [phone], option #4, and ask to speak to the on-call cardiologist.     Cardiovascular Clinic:   [address] [place] [address]. [place] [address]      As always, Thank you for trusting us with your health care needs!

## 2021-08-10 LAB
ATRIAL RATE - MUSE: 63 BPM
DIASTOLIC BLOOD PRESSURE - MUSE: NORMAL MMHG
INTERPRETATION ECG - MUSE: NORMAL
P AXIS - MUSE: 35 DEGREES
PR INTERVAL - MUSE: 170 MS
QRS DURATION - MUSE: 82 MS
QT - MUSE: 430 MS
QTC - MUSE: 440 MS
R AXIS - MUSE: 64 DEGREES
SYSTOLIC BLOOD PRESSURE - MUSE: NORMAL MMHG
T AXIS - MUSE: 59 DEGREES
VENTRICULAR RATE- MUSE: 63 BPM

## 2021-08-13 ENCOUNTER — ANCILLARY PROCEDURE (OUTPATIENT)
Dept: MRI IMAGING | Facility: CLINIC | Age: 86
End: 2021-08-13
Attending: PSYCHIATRY & NEUROLOGY
Payer: COMMERCIAL

## 2021-08-13 DIAGNOSIS — G45.9 TIA (TRANSIENT ISCHEMIC ATTACK): ICD-10-CM

## 2021-08-13 PROCEDURE — A9585 GADOBUTROL INJECTION: HCPCS | Performed by: RADIOLOGY

## 2021-08-13 PROCEDURE — 70549 MR ANGIOGRAPH NECK W/O&W/DYE: CPT | Mod: GC | Performed by: RADIOLOGY

## 2021-08-13 PROCEDURE — 70553 MRI BRAIN STEM W/O & W/DYE: CPT | Mod: GC | Performed by: RADIOLOGY

## 2021-08-13 PROCEDURE — 99207 MRA BRAIN (CIRCLE OF WILLIS) WO CONTRAST: CPT | Mod: GC | Performed by: RADIOLOGY

## 2021-08-13 RX ORDER — GADOBUTROL 604.72 MG/ML
7.5 INJECTION INTRAVENOUS ONCE
Status: COMPLETED | OUTPATIENT
Start: 2021-08-13 | End: 2021-08-13

## 2021-08-13 RX ORDER — GADOBUTROL 604.72 MG/ML
7.5 INJECTION INTRAVENOUS ONCE
Status: ACTIVE | OUTPATIENT
Start: 2021-08-13

## 2021-08-13 RX ADMIN — GADOBUTROL 7 ML: 604.72 INJECTION INTRAVENOUS at 18:15

## 2021-08-13 NOTE — DISCHARGE INSTRUCTIONS
MRI Contrast Discharge Instructions    The IV contrast you received today will pass out of your body in your  urine. This will happen in the next 24 hours. You will not feel this process.  Your urine will not change color.    Drink at least 4 extra glasses of water or juice today (unless your doctor  has restricted your fluids). This reduces the stress on your kidneys.  You may take your regular medicines.    If you are on dialysis: It is best to have dialysis today.    If you have a reaction: Most reactions happen right away. If you have  any new symptoms after leaving the hospital (such as hives or swelling),  call your hospital at the correct number below. Or call your family doctor.  If you have breathing distress or wheezing, call [phone].    Special instructions: ***    I have read and understand the above information.    Signature:______________________________________ Date:___________    Staff:__________________________________________ Date:___________     Time:__________    [facility] Departments:    ___[place]: [phone]  ___[facility]: [phone]  ___[place]: [phone] ___[facility]: [phone]  ___[facility]: [phone]  ___[facility]: [phone]  ___[phone]  ___[facility]: [phone]  ___[phone]

## 2021-08-13 NOTE — DISCHARGE INSTRUCTIONS
MRI Contrast Discharge Instructions    The IV contrast you received today will pass out of your body in your  urine. This will happen in the next 24 hours. You will not feel this process.  Your urine will not change color.    Drink at least 4 extra glasses of water or juice today (unless your doctor  has restricted your fluids). This reduces the stress on your kidneys.  You may take your regular medicines.    If you are on dialysis: It is best to have dialysis today.    If you have a reaction: Most reactions happen right away. If you have  any new symptoms after leaving the hospital (such as hives or swelling),  call your hospital at the correct number below. Or call your family doctor.  If you have breathing distress or wheezing, call 911.    Special instructions: ***    I have read and understand the above information.    Signature:______________________________________ Date:___________    Staff:__________________________________________ Date:___________     Time:__________    [facility] Departments:    ___[place]: [phone]  ___[facility]: [phone]  ___[place]: [phone] ___[facility]: [phone]  ___[facility]: [phone]  ___[facility]: [phone]  ___[phone]  ___[facility]: [phone]  ___[phone]

## 2021-08-13 NOTE — DISCHARGE INSTRUCTIONS
MRI Contrast Discharge Instructions    The IV contrast you received today will pass out of your body in your  urine. This will happen in the next 24 hours. You will not feel this process.  Your urine will not change color.    Drink at least 4 extra glasses of water or juice today (unless your doctor  has restricted your fluids). This reduces the stress on your kidneys.  You may take your regular medicines.    If you are on dialysis: It is best to have dialysis today.    If you have a reaction: Most reactions happen right away. If you have  any new symptoms after leaving the hospital (such as hives or swelling),  call your hospital at the correct number below. Or call your family doctor.  If you have breathing distress or wheezing, call 911.    Special instructions: ***    I have read and understand the above information.    Signature:______________________________________ Date:___________    Staff:__________________________________________ Date:___________     Time:__________    [facility] Departments:    ___[place]: [phone]  ___[facility]: [phone]  ___[place]: [phone] ___[facility]: [phone]  ___[facility]: [phone]  ___[facility]: [phone]  ___[phone]  ___[facility]: [phone]  ___[phone]

## 2021-08-17 ENCOUNTER — ANCILLARY PROCEDURE (OUTPATIENT)
Dept: NEUROLOGY | Facility: CLINIC | Age: 86
End: 2021-08-17
Attending: PSYCHIATRY & NEUROLOGY
Payer: COMMERCIAL

## 2021-08-17 DIAGNOSIS — R40.4 TRANSIENT ALTERATION OF AWARENESS: ICD-10-CM

## 2021-08-25 ENCOUNTER — TELEPHONE (OUTPATIENT)
Dept: NEUROLOGY | Facility: CLINIC | Age: 86
End: 2021-08-25

## 2021-08-25 NOTE — TELEPHONE ENCOUNTER
It appears he had EEG test last week but I still don't have the report. His MRI/A of brain was unremarkable. So far we haven't learned anything new. I will let him know when we get the EEG report. Thanks

## 2021-08-25 NOTE — TELEPHONE ENCOUNTER
[facility] Call Center    Phone Message    May a detailed message be left on voicemail: yes     Reason for Call: Other: Patient calling looking to speak with Dr. Gonzalez and care team. States that he did all the tests that were ordered. Wondering if anything has been found out from these tests, and if nothing has been found out, wondering where to go from here.     Please advise and call patient back at your earliest convenience to discuss further     Action Taken: Other: [facility]    Travel Screening: Not Applicable

## 2021-09-11 ENCOUNTER — HEALTH MAINTENANCE LETTER (OUTPATIENT)
Age: 86
End: 2021-09-11

## 2021-09-20 ENCOUNTER — TELEPHONE (OUTPATIENT)
Dept: NEUROLOGY | Facility: CLINIC | Age: 86
End: 2021-09-20

## 2021-09-20 NOTE — TELEPHONE ENCOUNTER
[facility] Call Center    Phone Message    May a detailed message be left on voicemail: yes     Reason for Call: Other: Sheila calling to request a call back due to not receiving the EEG results from 8/17/21 for Kuldeep. Please call Sheila at your earliest convenience to discuss.     Action Taken: Message routed to:  [facility]    Travel Screening: Not Applicable

## 2022-01-11 DIAGNOSIS — G12.1 KENNEDY'S DISEASE (H): Primary | ICD-10-CM

## 2022-01-13 ENCOUNTER — THERAPY VISIT (OUTPATIENT)
Dept: SPEECH THERAPY | Facility: CLINIC | Age: 87
End: 2022-01-13
Payer: COMMERCIAL

## 2022-01-13 ENCOUNTER — OFFICE VISIT (OUTPATIENT)
Dept: NEUROLOGY | Facility: CLINIC | Age: 87
End: 2022-01-13
Payer: COMMERCIAL

## 2022-01-13 VITALS
BODY MASS INDEX: 24.1 KG/M2 | HEIGHT: 68 IN | RESPIRATION RATE: 16 BRPM | OXYGEN SATURATION: 98 % | WEIGHT: 159 LBS | DIASTOLIC BLOOD PRESSURE: 63 MMHG | SYSTOLIC BLOOD PRESSURE: 105 MMHG | HEART RATE: 66 BPM

## 2022-01-13 DIAGNOSIS — R47.1 DYSARTHRIA: ICD-10-CM

## 2022-01-13 DIAGNOSIS — G12.1 KENNEDY'S DISEASE (H): Primary | ICD-10-CM

## 2022-01-13 DIAGNOSIS — G12.1 KENNEDY'S DISEASE (H): ICD-10-CM

## 2022-01-13 DIAGNOSIS — R22.1 LUMP IN NECK: Primary | ICD-10-CM

## 2022-01-13 DIAGNOSIS — R13.10 DYSPHAGIA, UNSPECIFIED TYPE: ICD-10-CM

## 2022-01-13 DIAGNOSIS — R13.12 OROPHARYNGEAL DYSPHAGIA: ICD-10-CM

## 2022-01-13 PROCEDURE — 92610 EVALUATE SWALLOWING FUNCTION: CPT | Mod: GN | Performed by: SPEECH-LANGUAGE PATHOLOGIST

## 2022-01-13 PROCEDURE — 92526 ORAL FUNCTION THERAPY: CPT | Mod: GN | Performed by: SPEECH-LANGUAGE PATHOLOGIST

## 2022-01-13 PROCEDURE — 92522 EVALUATE SPEECH PRODUCTION: CPT | Mod: GN | Performed by: SPEECH-LANGUAGE PATHOLOGIST

## 2022-01-13 PROCEDURE — 99213 OFFICE O/P EST LOW 20 MIN: CPT | Performed by: PSYCHIATRY & NEUROLOGY

## 2022-01-13 ASSESSMENT — PAIN SCALES - GENERAL: PAINLEVEL: NO PAIN (0)

## 2022-01-13 ASSESSMENT — MIFFLIN-ST. JEOR: SCORE: 1371.75

## 2022-01-13 NOTE — PROGRESS NOTES
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    Others at Clinic Visit: Daughter Sheila    Living Situation: Daughter    Patient Concerns/Goals: Increased swallowing difficulty, Increased speech difficulty    Observations: Patient pleasant and cooperative, talkative. Daughter present but quiet throughout. He hasn't been seen by SLP in clinic since 2018, he reports increased coughing/throat clearing with intake and a feeling of a lump/food stuck in his throat.    Current Mode of Nutrition: Oral diet of regular solids and thin liquids    Weight: 159lbs    Cardio-Respiratory Status: no overt deficits, PFTs not completed    Functional Rating Scale (ALS-FRS): not assessed this visit      Oral Motor/Swallowing  Oral Motor Function: Anomalies present:    Labial anomaly- ROM (mild), Strength (mild), Rate (adequate), right greater than left  Lingual anomaly- ROM (adequate), Strength (adequate), Rate (mild)  Velar elevation- severe hypernasal suggests deficits  Buccal Strength-  difficult to elicit    Volitional Abilities:  Cough- Functional  Throat Clear- Functional  Swallow- Present     Feeding Assistance: No assistance needed   Oral Care: BID reported    Swallow Function:   Thin Liquid-  No overt dysphagia or s/s aspiration with trials observed today. Oral control and propulsion adequate with laryngeal elevation adequate with appropriate timing. No overt s/s aspiration observed.    Dysphagia Diagnosis: Mild dysphagia     Dysphagia Intervention: Pt reports approximately once every other meal he will need to clear his throat, either due to food stuck in his throat and/or penetration (he is  unsure which). He reports this started just a few weeks ago and seems to occur with shrimp, stringy foods, or rice. SLP educated in swallowing anatomy and physiology and provided visual cues. Trained safe swallow strategies to reduce aspiration risks and ease PO intake (small bites/sips, slow rate of intake, chin tuck/neutral head position, mindful swallowing, avoid straws, use caution with straws, alternate consistencies). Also trained diet modifications to reduce risk of aspiration and ease intake (avoid hard dry foods with particles and choosing soft moist solids)      Speech Intelligibility/Functional Communication   Methods of communication: Verbal    Dysarthria: Mild to moderate    Speech:   Deficits in articulation- Decreased precision of articulation  Deficits in resonance- Hypernasal  Intelligibility- 100% to this SLP and per patient perspective, daughter feels she only understands 25%    Speech Intelligibility/Communication:  Communicates functionally    Augmentative and Alternative Communication (AAC):   Educated in option of voice amplifier to help with daughters understanding, he does not wish to pursue    Communication Intervention: SLP trained use of speech strategies to improve intelligibility and reduce fatigue with speaking (reducing background noise, facing the conversation partner, speaking slowly, exaggerating each sound, repeating words or rewording message, using as few words as possible, planning ahead to reduce impact of fatigue). SLP also trained conversational partners present today regarding listener strategies (giving speaker full attention, letting the speaker know if message not understood, repeating the part of the message that was understood so speaker only needs to repeat the part that was missed, asking yes/no questions when able).      Clinical Impression/Plan of Care  Treatment Diagnosis: Oropharyngeal Dysphagia     Recommendations:  Oral diet.  Soft, moist solid with no  particulate.  Continue use of compensatory strategies.    Plan of Care:  1 session evaluation and treatment.    Goals: Based on today's evaluation session patient and/or caregiver will have understanding of current communication and/or swallowing status and recommendations for management.    Educational Assessment:  Learners- Patient  Barriers to Learning- No barriers.    Education provided/response:   Speech  Swallowing  Diet  Verbalized understanding    Treatment provided this date:   Swallow intervention, 10 minutes (see above for details)  Communication intervention, 3 minutes (see above for details)    Response to recommendations/treatment: verbalized understanding, asked appropriate questions, agreed to POC    Goal attainment: All goals met    Risks and benefits of evaluation/treatment have been explained.  Patient, family and/or caregiver are in agreement with Plan of Care.    Timed Code Treatment Minutes: 0 minutes    Total Treatment Time (sum of timed and untimed services): 29 minutes

## 2022-01-13 NOTE — PROGRESS NOTES
Service Date: 2022    Casper Ortega MD   [facility]  [address] [place] [address]    RE:      Paul A. Franke  MRN:  [id number]  :   1935    Dear Dr. Ortega:    I had the pleasure to see Mr. Franke in followup at the [facility].  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  We last met in 2021.  He was working at [employer] until , but then quit,  Largely due to a right comminuted femoral fracture he had after a fall in 2021. While this fracture healed well, he does not feel that the right leg is now as strong as it used to be before.  His weakness has gradually progressed over time.  The last time he fell was 2021.  Since the summer, he is using a walker outside the house for safety, and this has definitely been helpful.  He has not fallen again. His arm strength is slightly worse than last year, although he can still raise his arms overhead, put things on shelves and grasp with little limitation.  He can cut his food and brush his teeth without problems.  His dysphagia is also slightly worse, and his food has to be cut into smaller pieces.  He has to eat slower, and he has to flush with a beverage at times for the food to get down.  He has a nasal voice due to Be disease, which has not changed lately.  He denies sialorrhea.  He feels that sometimes there is a lump on the left side of his throat, and food gets stuck; this feeling is recent.    In 2021, the fall occurred due to an episode of visual illusions that led to the patient collapsing.  I was not sure of the etiology. He was evaluated by an ophthalmologist, and nothing concerning was found.  I ordered an MRA of the head and neck; the arteries were widely patent, and there were no recent or remote infarcts.  I also ordered a Cardiology evaluation, and an EKG was unremarkable. Routine EEG showed diffuse theta slowing, but no focal abnormalities or epileptiform  "discharges.  He never had another episode like this.     /63   Pulse 66   Resp 16   Ht 1.721 m (5' 7.75\")   Wt 72.1 kg (159 lb)   SpO2 98%   BMI 24.35 kg/m      Exam was not repeated due to time constraints.    In summary, Mr. Franke has Be disease.  We discussed a number of practical issues.  He is experiencing the expected slow progression of the disease over time. It would be helpful for him to have an appointment with Physical Therapy; unfortunately, our therapist was not in clinic today.  He will also be seen by Speech Therapy given his gradually worsening dysphagia.  Out of caution, I will get an ultrasound of his thyroid, although I palpated his neck, and I could not feel any lumps or masses there.  He will return to clinic for followup in 1 year or sooner if needed.  There are no new treatments available for the Be disease.  He does not complain of any dyspnea on exertion or orthopnea, so I will not order pulmonary function tests today.    Total time spent on this encounter today 25 minutes, of which 10 on face to face, 10 on post visit note dictation, editing and orders, 5 on previsit chart review.    Sincerely,    Alban Gonzalez MD        D: 2022   T: 2022   MT: [email or web address]    Name:     FRANKE, PAUL A.  MRN:      [id number]        Account:      [id number]   :      1935           Service Date: 2022       Document: [id number]    "

## 2022-01-13 NOTE — LETTER
2022       RE: Paul Allen Franke  [address]  [place] [address]     Dear Colleague,    Thank you for referring your patient, Paul Allen Franke, to the [facility] NEUROLOGY CLINIC [place] at [facility]. Please see a copy of my visit note below.    Service Date: 2022    Casper Ortega MD   [facility]  [address] [address]    RE:      Paul A. Franke  MRN:  [id number]  :   1935    Dear Dr. Ortgea:    I had the pleasure to see Mr. Franke in followup at the [facility].  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  We last met in 2021.  He was working at [employer] until , but then quit,  Largely due to a right comminuted femoral fracture he had after a fall in 2021. While this fracture healed well, he does not feel that the right leg is now as strong as it used to be before.  His weakness has gradually progressed over time.  The last time he fell was 2021.  Since the summer, he is using a walker outside the house for safety, and this has definitely been helpful.  He has not fallen again. His arm strength is slightly worse than last year, although he can still raise his arms overhead, put things on shelves and grasp with little limitation.  He can cut his food and brush his teeth without problems.  His dysphagia is also slightly worse, and his food has to be cut into smaller pieces.  He has to eat slower, and he has to flush with a beverage at times for the food to get down.  He has a nasal voice due to Be disease, which has not changed lately.  He denies sialorrhea.  He feels that sometimes there is a lump on the left side of his throat, and food gets stuck; this feeling is recent.    In 2021, the fall occurred due to an episode of visual illusions that led to the patient collapsing.  I was not sure of the etiology. He was evaluated by  "an ophthalmologist, and nothing concerning was found.  I ordered an MRA of the head and neck; the arteries were widely patent, and there were no recent or remote infarcts.  I also ordered a Cardiology evaluation, and an EKG was unremarkable. Routine EEG showed diffuse theta slowing, but no focal abnormalities or epileptiform discharges.  He never had another episode like this.     /63   Pulse 66   Resp 16   Ht 1.721 m (5' 7.75\")   Wt 72.1 kg (159 lb)   SpO2 98%   BMI 24.35 kg/m      Exam was not repeated due to time constraints.    In summary, Mr. Franke has Be disease.  We discussed a number of practical issues.  He is experiencing the expected slow progression of the disease over time. It would be helpful for him to have an appointment with Physical Therapy; unfortunately, our therapist was not in clinic today.  He will also be seen by Speech Therapy given his gradually worsening dysphagia.  Out of caution, I will get an ultrasound of his thyroid, although I palpated his neck, and I could not feel any lumps or masses there.  He will return to clinic for followup in 1 year or sooner if needed.  There are no new treatments available for the Be disease.  He does not complain of any dyspnea on exertion or orthopnea, so I will not order pulmonary function tests today.    Total time spent on this encounter today 25 minutes, of which 10 on face to face, 10 on post visit note dictation, editing and orders, 5 on previsit chart review.        Alban Gonzalez MD        D: 2022   T: 2022   MT: jose    Name:     FRANKE, PAUL A.  MRN:      [id number]        Account:      [id number]   :      1935           Service Date: 2022       Document: [id number]  "

## 2022-01-13 NOTE — PATIENT INSTRUCTIONS
Speech therapy evaluation  Ultrasound of the thyroid if possible today  Physical therapy can call you (they are not in Clinic today)

## 2022-04-23 ENCOUNTER — HEALTH MAINTENANCE LETTER (OUTPATIENT)
Age: 87
End: 2022-04-23

## 2022-10-30 ENCOUNTER — HEALTH MAINTENANCE LETTER (OUTPATIENT)
Age: 87
End: 2022-10-30

## 2023-01-10 DIAGNOSIS — G12.1 KENNEDY'S DISEASE (H): Primary | ICD-10-CM

## 2023-01-12 ENCOUNTER — THERAPY VISIT (OUTPATIENT)
Dept: SPEECH THERAPY | Facility: CLINIC | Age: 88
End: 2023-01-12
Payer: COMMERCIAL

## 2023-01-12 ENCOUNTER — OFFICE VISIT (OUTPATIENT)
Dept: NEUROLOGY | Facility: CLINIC | Age: 88
End: 2023-01-12
Payer: COMMERCIAL

## 2023-01-12 ENCOUNTER — THERAPY VISIT (OUTPATIENT)
Dept: PHYSICAL THERAPY | Facility: CLINIC | Age: 88
End: 2023-01-12
Payer: COMMERCIAL

## 2023-01-12 VITALS
HEART RATE: 68 BPM | OXYGEN SATURATION: 97 % | WEIGHT: 166 LBS | DIASTOLIC BLOOD PRESSURE: 72 MMHG | SYSTOLIC BLOOD PRESSURE: 111 MMHG | BODY MASS INDEX: 25.43 KG/M2 | RESPIRATION RATE: 16 BRPM

## 2023-01-12 DIAGNOSIS — R79.9 ABNORMAL BLOOD CHEMISTRY: Primary | ICD-10-CM

## 2023-01-12 DIAGNOSIS — M83.9 ADULT OSTEOMALACIA, UNSPECIFIED: ICD-10-CM

## 2023-01-12 DIAGNOSIS — R47.1 DYSARTHRIA: ICD-10-CM

## 2023-01-12 DIAGNOSIS — G12.1 KENNEDY'S DISEASE (H): ICD-10-CM

## 2023-01-12 DIAGNOSIS — R13.12 OROPHARYNGEAL DYSPHAGIA: ICD-10-CM

## 2023-01-12 DIAGNOSIS — G12.1 KENNEDY'S DISEASE (H): Primary | ICD-10-CM

## 2023-01-12 PROCEDURE — 97162 PT EVAL MOD COMPLEX 30 MIN: CPT | Mod: GP | Performed by: PHYSICAL THERAPIST

## 2023-01-12 PROCEDURE — 92526 ORAL FUNCTION THERAPY: CPT | Mod: GN | Performed by: SPEECH-LANGUAGE PATHOLOGIST

## 2023-01-12 PROCEDURE — 99214 OFFICE O/P EST MOD 30 MIN: CPT | Performed by: PSYCHIATRY & NEUROLOGY

## 2023-01-12 PROCEDURE — 92522 EVALUATE SPEECH PRODUCTION: CPT | Mod: GN | Performed by: SPEECH-LANGUAGE PATHOLOGIST

## 2023-01-12 PROCEDURE — 97535 SELF CARE MNGMENT TRAINING: CPT | Mod: GP | Performed by: PHYSICAL THERAPIST

## 2023-01-12 PROCEDURE — 92610 EVALUATE SWALLOWING FUNCTION: CPT | Mod: GN | Performed by: SPEECH-LANGUAGE PATHOLOGIST

## 2023-01-12 ASSESSMENT — PAIN SCALES - GENERAL: PAINLEVEL: MODERATE PAIN (5)

## 2023-01-12 NOTE — PROGRESS NOTES
"[facility]     OUTPATIENT PHYSICAL THERAPY CLINIC NOTE  Franke,Paul Allen                       YOB: 1935  [id number]     Type of visit:                                                                              Evaluation            Date of service: 1/12/2023     Referring provider: Dr. Gonzalez     Others present at visit:  None     Medical diagnosis:   Be's disease     Date of diagnosis: 1994     Pertinent history of current problem (include personal factors and/or comorbidities that impact the plan of care): Gradual BLE weakness over many years (40+).     Cardio-respiratory status:  Forced vital capacity: NT this date     Height/Weight: 5'6\" / 149 lb     Living environment:  Apartment, with daughter     Living environment barriers:  1 stair to enter (no railing present), to sidewalk out front, able to hold onto stone work   Elevator access in building and at work to breakroom      Current assistance/living environment:  Lives with daughter (she doesn't work, on disability)      Current mobility equipment:  Standard cane(s)- does not use  WW- uses majority of mobility      Current ADL equipment:  Tub/shower combo  Shower chair  Wall grab bar- suction cup     Technology used: NT    Patient concerns/goals: 2 falls in 1 month requiring hospitalization-knees are giving out on him without warning.  Patient unable to stand up if he falls    Evaluation   Interview completed.   Pain assessment:  Pain denied     Range of motion: Bilateral ankle dorsiflexion neutral, bilateral knee extension lacking around 5 degrees from neutral     Manual muscle testing: Bilateral hip flexion 3+/5, bilateral knee extension 4/5, bilateral knee flexion 4/5, bilateral ankle dorsiflexion 4+/5   Gait: Patient ambulates with 2 wheeled walker modified independence-reduced kathy, decreased stride length, flexed " forward posture   Cognition: Intact    Fall Risk Screen:   Has the patient fallen 2 or more times in the last year? Yes      Has the patient fallen and had an injury in the past year? Yes       Timed Up and Go Score: >13.5 seconds    Is the patient a fall risk? Yes, department fall risk interventions implemented     Impairments:  Fatigue  Muscle atrophy  Coordination  Balance  Range of motion     Treatment diagnosis:  Impaired mobility  Impaired activities of daily living    Clinical Presentation: Evolving/Changing  Clinical Presentation Rationale: Personal factors, body systems involved, progressive nature  Clinical Decision Making (Complexity): Moderate complexity     Recommendations/Plan of care:  1 session evaluation & treatment.     Goals:   Target date: 1/12/2023  Patient, family and/or caregiver will verbalize understanding of evaluation results and implications for functional performance.  Patient, family and/or caregiver will verbalize/demonstrate understanding of compensatory methods /equipment to enhance functional independence and safety.  Patient, family and/or caregiver will verbalize energy management techniques appropriate for status and setting.    Educational assessment/barriers to learning:   No barriers noted     Treatment provided this date:   ADL/self-care management-10 minutes  -Educated patient on energy conservation techniques including pacing of activities, frequent rest breaks, use of assistive device and monitoring signs of fatigue (increased muscle soreness, weakness, cramps, fasciculations) for safe functional mobility and performance of daily tasks  -Collaborated extensively regarding recommendation to use walker at all times (even for very short distances) to decrease fall risk due to sudden nature of knee buckling.  Also recommended patient use wheeled mobility when outside the home/longer distances.  Patient reports not ready for this, but recommended patient continue to assess,  especially if bilateral knee buckling is still present with use of walker    Response to treatment/recommendations: Patient verbalizes understanding and agreement    Goal attainment:  All goals met     Risks and benefits of evaluation/treatment have been explained.  Patient, family and/or caregiver are in agreement with Plan of Care.     Timed Code Treatment Minutes: 10  Total Treatment Time (sum of timed and untimed services): 15    Signature: Adilia Langley, PT   Date: 1/12/2023

## 2023-01-12 NOTE — PROGRESS NOTES
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    Others at Clinic Visit: Daughter Sheila    Living Situation: Daughter    Patient Concerns/Goals: Increased swallowing difficulty, Increased speech difficulty    Observations: Patient pleasant and cooperative, talkative. Daughter present but quiet throughout. He continues to report coughing/throat clearing with intake and a feeling of a lump/food stuck in his throat however he reports these have been present for years.    Current Mode of Nutrition: Oral diet of regular solids and thin liquids although avoids things such as rice, noodles, and shrimp as they get stuck in his throat. He also reports he needs extra time to chew most foods now.    Weight: 166lbs (159lbs at last visit)    Cardio-Respiratory Status: no overt deficits, PFTs not completed      Oral Motor/Swallowing  Oral Motor Function: Anomalies present:    Labial anomaly- ROM (mild), Strength (moderate), Rate (mild), all deficits greater on right side than left  Lingual anomaly- ROM (adequate), Strength (adequate), Rate (mild)  Velar elevation- severe hypernasal suggests deficits  Buccal Strength-  difficult to elicit    Volitional Abilities:  Cough- Functional  Throat Clear- Functional  Swallow- Present     Feeding Assistance: No assistance needed   Oral Care: BID reported    Swallow Function:   Thin Liquid-  mildly reduced bolus prep/control and A-P propulsion, reduced pharyngeal constriction, multiple swallows to pass bolus (three), reduced laryngeal elevation, but no  "overt s/s aspiration.  Regular Solids- Increased mastication time, oral residue, reduced bolus prep/control and A-P propulsion, reduced pharyngeal constriction, multiple swallows to pass bolus (3-4), reduced laryngeal elevation, with OVERT s/s aspiration including cough, throat clear, watery eyes.     Dysphagia Diagnosis: Mild-moderate dysphagia     Dysphagia Intervention: Overt signs of aspiration with regular solids, which he states is due to it being too dry and having a small piece \"go down the wrong way\". He continues to report that approximately once every other meal he will need to clear his throat, either due to food stuck in his throat and/or penetration. However, now his daughter is reporting that episode such as that observed today are occurring 1-2 times per week, in addition to the consistent throat clearing. Through discussion it was determined this happens most often with his Pepsi with dinner and he admits he is likely fatigued and talking/no being mindful of swallowing during these episodes. SLP again educated in swallowing anatomy and physiology and trained safe swallow strategies to reduce aspiration risks and ease PO intake (small bites/sips, slow rate of intake, mindful swallowing, alternate consistencies, monitor for fatigue). Also trained diet modifications to reduce risk of aspiration and ease intake (avoid hard dry foods with particles and choosing soft moist solids).       Speech Intelligibility/Functional Communication   Methods of communication: Verbal    Dysarthria: Mild to moderate    Speech:   Deficits in articulation- Decreased precision of articulation  Deficits in resonance- Hypernasal  Intelligibility- 100% to this SLP and per patient perspective, daughter feels she only understands 25%    Speech Intelligibility/Communication:  Communicates functionally    Augmentative and Alternative Communication (AAC):   Again educated in option of voice amplifier to help with daughters " understanding, he does not wish to pursue    Communication Intervention: SLP reviewed speech strategies to improve intelligibility and reduce fatigue with speaking as well as partner strategies. They report they have handout from last visit and deny questions regarding use.       Clinical Impression/Plan of Care  Treatment Diagnosis: Oropharyngeal Dysphagia     Recommendations:  Oral diet. Strongly recommend soft moist solids  Continue use of swallowing strategies  Continue use of speech strategies  Consider use of voice amplifier    Plan of Care:  1 session evaluation and treatment.    Goals: Based on today's evaluation session patient and/or caregiver will have understanding of current communication and/or swallowing status and recommendations for management.    Educational Assessment:  Learners- Patient  Barriers to Learning- No barriers.    Education provided/response:   Speech  Swallowing  Diet  Verbalized understanding    Treatment provided this date:   Swallow intervention, 12 minutes (see above for details)  Communication intervention, 0 minutes (see above for details)    Response to recommendations/treatment: verbalized understanding, asked appropriate questions, agreed to POC    Goal attainment: All goals met    Risks and benefits of evaluation/treatment have been explained.  Patient, family and/or caregiver are in agreement with Plan of Care.    Timed Code Treatment Minutes: 0 minutes    Total Treatment Time (sum of timed and untimed services): 33 minutes

## 2023-01-12 NOTE — LETTER
2023       RE: Paul Allen Franke  [address]  [place] [facility] [facility] [place] [address]     Dear Colleague,    Thank you for referring your patient, Paul Allen Franke, to the [facility] NEUROLOGY CLINIC [place] at [facility]. Please see a copy of my visit note below.    x    Service Date: 2023    Casper Ortega MD   [facility]  [address] [place] [address]    RE:      Paul A. Franke  MRN:  [id number]  :   1935    Dear Dr. 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 In the last year, he is getting a little weaker with his legs.  He had a few episodes when either his right or left leg gave out, and he had at least 2 falls and some serious ones.  One required an ED visit in 2022, and another one was 2 days ago in his home where he injured some ribs.  He is not tender there, so it is unlikely he has a fracture. Prior to both of those falls, he was not using his walker.  He was trying to take a few steps inside the house. He does not have a wheelchair.  He does not complain of much arm weakness.  He can raise his arms overhead, grasp things from shelves and can do things with his hands without limitations.  He can write, brush his teeth and cut his food without major problems.      He has chronic stable dysphagia.  He does not think it is worse from last year, but he does still note choking episodes occurring occasionally with liquids and solids, and he has to eat a bit carefully.  His speech is nasal, but there is no obvious dysarthria.  No excessive secretions reported.    MEDICATIONS:  Reviewed and are as per Epic record.    /72   Pulse 68   Resp 16   Wt 75.3 kg (166 lb)   SpO2 97%    BMI 25.43 kg/m      PHYSICAL EXAMINATION:  He has the typical nasal speech of Be disease. He has, as expected, prominent lower facial atrophy and fasciculations in the tongue and face.  There is also weakness of orbicularis oculi and oris, especially oris as expected with this disease.  Tongue shows atrophy and some fasciculations and normal lateral movements.  Neck flexion is 4-/5, and extension is full.  His strength in the deltoids is 4+, close to 5.  Biceps strength is 4+ bilaterally.  Triceps is full on the left.  On the right, it is 4.  Intrinsic hand muscles are 4- to 4 for FDIs and APBs.  Hip flexion is 4- right, 3 to 4- left.  Knee extension is 4.  Knee flexion is 4.  Foot dorsiflexion is 4+, stronger than proximal muscles.     In summary, Kuldeep has very slow progression of his Be disease as expected. There is still no disease-modifying treatment available for his condition.  Patients with Be should get cardiac screening, but he did have an EKG during his last ED visit on 12/09/2022, which was unremarkable; I reviewed it. During the last ED visit, he also had a CBC that was normal, a basic metabolic panel that showed a potassium of 2.8 that was replaced, the cause is not clear, and glucose of 112.  His A1c done in 09/2021 was 6%.  I will repeat an A1c, and I will also check a vitamin D level to assess his bone health, as he has had several falls.  He should use a walker at all times, and I am going to have Physical Therapy see him to give him specific instructions on how to reduce the fall risk.  He may qualify for using a wheelchair for longer distances as well.  He will also see our speech therapist.  Otherwise, I do not have any new treatments to propose, and I will see him in followup in 1 year or sooner if needed.      Billing MDM level 4 (moderate) based on 1) Problems assessed: One chronic disorder with progression, exacerbation, or SE of treatment (Be disease) and 2)  Amount/Complexity: Review of the results of 3 or more unique tests (CBC, BMP, EKG, done 2022) and ordering 2 unique tests today (A1c, vitamin D deficiency screening).       D: 2023   T: 2023   MT: jose    Name:     FRANKE, PAUL A.  MRN:      [id number]        Account:      [id number]   :      1935           Service Date: 2023       Document: [id number]        Again, thank you for allowing me to participate in the care of your patient.      Sincerely,    Alban Gonzalez MD

## 2023-01-12 NOTE — PROGRESS NOTES
Service Date: 2023    Casper Ortega MD   [facility]  [address] [place] [address]    RE:      Paul A. Franke  MRN:  [id number]  :   1935    Dear Dr. 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 In the last year, he is getting a little weaker with his legs.  He had a few episodes when either his right or left leg gave out, and he had at least 2 falls and some serious ones.  One required an ED visit in 2022, and another one was 2 days ago in his home where he injured some ribs.  He is not tender there, so it is unlikely he has a fracture. Prior to both of those falls, he was not using his walker.  He was trying to take a few steps inside the house. He does not have a wheelchair.  He does not complain of much arm weakness.  He can raise his arms overhead, grasp things from shelves and can do things with his hands without limitations.  He can write, brush his teeth and cut his food without major problems.      He has chronic stable dysphagia.  He does not think it is worse from last year, but he does still note choking episodes occurring occasionally with liquids and solids, and he has to eat a bit carefully.  His speech is nasal, but there is no obvious dysarthria.  No excessive secretions reported.    MEDICATIONS:  Reviewed and are as per Epic record.    /72   Pulse 68   Resp 16   Wt 75.3 kg (166 lb)   SpO2 97%   BMI 25.43 kg/m      PHYSICAL EXAMINATION:  He has the typical nasal speech of Be disease. He has, as expected, prominent lower facial atrophy and fasciculations in the tongue and face.  There is also weakness of orbicularis oculi and oris, especially oris as expected with this disease.  Tongue shows atrophy and some  fasciculations and normal lateral movements.  Neck flexion is 4-/5, and extension is full.  His strength in the deltoids is 4+, close to 5.  Biceps strength is 4+ bilaterally.  Triceps is full on the left.  On the right, it is 4.  Intrinsic hand muscles are 4- to 4 for FDIs and APBs.  Hip flexion is 4- right, 3 to 4- left.  Knee extension is 4.  Knee flexion is 4.  Foot dorsiflexion is 4+, stronger than proximal muscles.     In summary, Negar has very slow progression of his Be disease as expected. There is still no disease-modifying treatment available for his condition.  Patients with Be should get cardiac screening, but he did have an EKG during his last ED visit on 12/09/2022, which was unremarkable; I reviewed it. During the last ED visit, he also had a CBC that was normal, a basic metabolic panel that showed a potassium of 2.8 that was replaced, the cause is not clear, and glucose of 112.  His A1c done in 09/2021 was 6%.  I will repeat an A1c, and I will also check a vitamin D level to assess his bone health, as he has had several falls.  He should use a walker at all times, and I am going to have Physical Therapy see him to give him specific instructions on how to reduce the fall risk.  He may qualify for using a wheelchair for longer distances as well.  He will also see our speech therapist.  Otherwise, I do not have any new treatments to propose, and I will see him in followup in 1 year or sooner if needed.      Billing MDM level 4 (moderate) based on 1) Problems assessed: One chronic disorder with progression, exacerbation, or SE of treatment (Be disease) and 2) Amount/Complexity: Review of the results of 3 or more unique tests (CBC, BMP, EKG, done 12/2022) and ordering 2 unique tests today (A1c, vitamin D deficiency screening).     Sincerely,    Alban Gonzalez MD        D: 01/12/2023   T: 01/12/2023   MT: jose    Name:     FRANKE, PAUL A.  MRN:      [id number]        Account:       [id number]   :      1935           Service Date: 2023       Document: [id number]

## 2023-01-12 NOTE — DISCHARGE INSTRUCTIONS
I want you to use the walker 100% of the time for energy conservation  Focus on pacing activities- short walks, frequent rest breaks    Christi Langley PT, DPT  Physical Therapist  [facility] [place] - [address]  [address] [place] [address]  [email or web address]  Appointments: [phone]

## 2023-01-26 ENCOUNTER — LAB (OUTPATIENT)
Dept: LAB | Facility: CLINIC | Age: 88
End: 2023-01-26
Payer: COMMERCIAL

## 2023-01-26 DIAGNOSIS — E55.9 VITAMIN D DEFICIENCY: Primary | ICD-10-CM

## 2023-01-26 DIAGNOSIS — M83.9 ADULT OSTEOMALACIA, UNSPECIFIED: ICD-10-CM

## 2023-01-26 DIAGNOSIS — R79.9 ABNORMAL BLOOD CHEMISTRY: ICD-10-CM

## 2023-01-26 LAB
DEPRECATED CALCIDIOL+CALCIFEROL SERPL-MC: 9 UG/L (ref 20–75)
HBA1C MFR BLD: 6.3 %

## 2023-01-26 PROCEDURE — 83036 HEMOGLOBIN GLYCOSYLATED A1C: CPT | Performed by: PSYCHIATRY & NEUROLOGY

## 2023-01-26 PROCEDURE — 82306 VITAMIN D 25 HYDROXY: CPT | Performed by: PSYCHIATRY & NEUROLOGY

## 2023-01-26 PROCEDURE — 36415 COLL VENOUS BLD VENIPUNCTURE: CPT | Performed by: PATHOLOGY

## 2023-01-30 RX ORDER — ERGOCALCIFEROL 1.25 MG/1
1 CAPSULE, LIQUID FILLED ORAL WEEKLY
Qty: 8 CAPSULE | Refills: 0 | Status: SHIPPED | OUTPATIENT
Start: 2023-01-30 | End: 2023-03-21

## 2023-06-01 ENCOUNTER — HEALTH MAINTENANCE LETTER (OUTPATIENT)
Age: 88
End: 2023-06-01

## 2024-02-20 ENCOUNTER — PATIENT OUTREACH (OUTPATIENT)
Dept: NEUROLOGY | Facility: CLINIC | Age: 89
End: 2024-02-20
Payer: COMMERCIAL

## 2024-02-20 DIAGNOSIS — G12.1 KENNEDY'S DISEASE (H): Primary | ICD-10-CM

## 2024-02-20 NOTE — PROGRESS NOTES
ALS Care Coordination - Outreach Note    REASON FOR CONTACT:   Clinic Care Coordination - Follow-up (pre-visit)       ASSESSMENT:       2/19/2024   Since last visit   Key event: Hospitalized, skin breakdown, fall? Skin breakdown   Principle concerns: Any new? Falling Is there anything else we can do   Respiratory, sleep, energy symptoms: Any new? Excessive daytime sleepiness;Sleep difficulty;Daytime fatigue   Bulbar symptoms: Any new or worse? Dry mouth;Difficulty swallowing   Communication: Any new issues? Difficulty speaking or difficulty being understood by others   Gastrostomy: Any concerns? Not applicable, do not have a gastrostomy   Gross motor and mobility: Any new concerns? Increased difficulty walking   Pain/discomfort: Any new symptoms? No pain   Medication: Taking riluzole, Radicava, or Relyvrio? No, none of these   Medication: Have had to stop riluzole, Radicava, or Relyvrio? No, none of these   Medication: Taking Mucinex or Robinul? Yes   Medication: How much, how often? Once a day   Home care: Currently receiving services? No   Home care: Equipment used at home? No   Home care: Medical equipment company? Don t know        PLAN:   - Patient to follow up in clinic as scheduled.      Lorena Ackerman LPN  ALS Neurology Care Coordinator  [facility] Service Line

## 2024-02-22 ENCOUNTER — TRANSFERRED RECORDS (OUTPATIENT)
Dept: NEUROLOGY | Facility: CLINIC | Age: 89
End: 2024-02-22

## 2024-02-22 ENCOUNTER — THERAPY VISIT (OUTPATIENT)
Dept: PHYSICAL THERAPY | Facility: CLINIC | Age: 89
End: 2024-02-22
Payer: COMMERCIAL

## 2024-02-22 ENCOUNTER — THERAPY VISIT (OUTPATIENT)
Dept: SPEECH THERAPY | Facility: CLINIC | Age: 89
End: 2024-02-22
Payer: COMMERCIAL

## 2024-02-22 ENCOUNTER — OFFICE VISIT (OUTPATIENT)
Dept: NEUROLOGY | Facility: CLINIC | Age: 89
End: 2024-02-22
Payer: COMMERCIAL

## 2024-02-22 ENCOUNTER — LAB (OUTPATIENT)
Dept: LAB | Facility: CLINIC | Age: 89
End: 2024-02-22
Payer: COMMERCIAL

## 2024-02-22 VITALS
SYSTOLIC BLOOD PRESSURE: 125 MMHG | HEART RATE: 75 BPM | BODY MASS INDEX: 24.81 KG/M2 | DIASTOLIC BLOOD PRESSURE: 74 MMHG | OXYGEN SATURATION: 95 % | WEIGHT: 162 LBS

## 2024-02-22 DIAGNOSIS — E43 UNSPECIFIED SEVERE PROTEIN-CALORIE MALNUTRITION (H): ICD-10-CM

## 2024-02-22 DIAGNOSIS — G12.1 KENNEDY'S DISEASE (H): Primary | ICD-10-CM

## 2024-02-22 DIAGNOSIS — G12.1 KENNEDY'S DISEASE (H): ICD-10-CM

## 2024-02-22 DIAGNOSIS — R79.9 ABNORMAL FINDING OF BLOOD CHEMISTRY, UNSPECIFIED: ICD-10-CM

## 2024-02-22 DIAGNOSIS — R13.12 OROPHARYNGEAL DYSPHAGIA: ICD-10-CM

## 2024-02-22 DIAGNOSIS — R47.1 DYSARTHRIA: ICD-10-CM

## 2024-02-22 DIAGNOSIS — E55.9 VITAMIN D DEFICIENCY: Primary | ICD-10-CM

## 2024-02-22 DIAGNOSIS — M81.0 AGE-RELATED OSTEOPOROSIS WITHOUT CURRENT PATHOLOGICAL FRACTURE: ICD-10-CM

## 2024-02-22 LAB
HBA1C MFR BLD: 6.3 %
VIT D+METAB SERPL-MCNC: 13 NG/ML (ref 20–50)

## 2024-02-22 PROCEDURE — 82306 VITAMIN D 25 HYDROXY: CPT | Performed by: PSYCHIATRY & NEUROLOGY

## 2024-02-22 PROCEDURE — 97162 PT EVAL MOD COMPLEX 30 MIN: CPT | Mod: GP | Performed by: PHYSICAL THERAPIST

## 2024-02-22 PROCEDURE — G2211 COMPLEX E/M VISIT ADD ON: HCPCS | Performed by: PSYCHIATRY & NEUROLOGY

## 2024-02-22 PROCEDURE — 97535 SELF CARE MNGMENT TRAINING: CPT | Mod: GP | Performed by: PHYSICAL THERAPIST

## 2024-02-22 PROCEDURE — 99214 OFFICE O/P EST MOD 30 MIN: CPT | Performed by: PSYCHIATRY & NEUROLOGY

## 2024-02-22 PROCEDURE — 36415 COLL VENOUS BLD VENIPUNCTURE: CPT | Performed by: PATHOLOGY

## 2024-02-22 PROCEDURE — 99000 SPECIMEN HANDLING OFFICE-LAB: CPT | Performed by: PATHOLOGY

## 2024-02-22 PROCEDURE — 83036 HEMOGLOBIN GLYCOSYLATED A1C: CPT | Performed by: PSYCHIATRY & NEUROLOGY

## 2024-02-22 PROCEDURE — 92610 EVALUATE SWALLOWING FUNCTION: CPT | Mod: GN | Performed by: SPEECH-LANGUAGE PATHOLOGIST

## 2024-02-22 RX ORDER — BISACODYL 5 MG/1
5 TABLET, DELAYED RELEASE ORAL DAILY PRN
COMMUNITY

## 2024-02-22 RX ORDER — BISACODYL 10 MG
10 SUPPOSITORY, RECTAL RECTAL DAILY PRN
COMMUNITY

## 2024-02-22 ASSESSMENT — PAIN SCALES - GENERAL: PAINLEVEL: NO PAIN (0)

## 2024-02-22 NOTE — PROGRESS NOTES
"SPEECH LANGUAGE PATHOLOGY EVALUATION    See electronic medical record for Abuse and Falls Screening details.    Subjective      Presenting condition or subjective complaint:  increased coughing/choking episodes on solids. Hx of Be's disease  Date of onset: 02/22/24    Relevant medical history:   refer to medical chart    Prior diagnostic imaging/testing results:     no previous swallow studies noted in chart.  Prior therapy history for the same diagnosis, illness or injury:    last saw SLP in ALS clinic on 1/12/23    Living Environment  Social support:   Pt's daughter provides support at home     Employment:    retired  Hobbies/Interests:  none stated    Patient goals for therapy:  improve swallowing ease    Pain assessment: Pain denied     Objective     SWALLOW EVALUTION  Dysphagia history: Pt reports continuing to eat/drink but he has been having more choking episodes.  These occur with solids more than liquids.  He denies coughing and choking with thin liquids.  He also endorses significantly dry mouth.  Sometimes he has a hard time getting his swallow \"primed\" in the morning.  Current Diet/Method of Nutritional Intake: thin liquids (level 0), easy to chew (level 7)        CLINICAL SWALLOW EVALUATION  Oral Motor Function: Dentition: natural dentition  Secretion management: WFL  Mucosal quality: dry, sticky  Mandibular function: intact  Oral labial function: impaired retraction, impaired pursing  Lingual function: impaired protrusion, impaired left lateral movement, impaired right lateral movement  Velar function: intact   Buccal function: impaired  Laryngeal function: cough, throat clear, volitional swallow, voicing WFL     Level of assist required for feeding: no assistance needed   Textures Trialed:   Clinical Swallow Eval: Thin Liquids  Mode of presentation: cup, self-fed   Volume presented: 6 oz water  Preparatory Phase: WFL  Oral Phase: WFL  Pharyngeal phase of swallow: intact   Strategies trialed " during procedure: none   Diagnostic statement: No overt clinical s/sx of aspiration/penetration noted.          ESOPHAGEAL PHASE OF SWALLOW  no observed or reported concerns related to esophageal function     SWALLOW ASSESSMENT CLINICAL IMPRESSIONS AND RATIONALE  Diet Consistency Recommendations: thin liquids (level 0), easy to chew (level 7)    Recommended Feeding/Eating Techniques: small bolus size, slow rate of intake, alternate food and liquid intake, maintain upright posture during/after eating for 30 minutes, minimize distractions during oral intake, increase moisture of solids    Medication Administration Recommendations: Whole with puree for larger pills in the morning, ok to continue with liquid as tolerated  Instrumental Assessment Recommendations:  no instrumental exam indicated at this time      Assessment & Plan   CLINICAL IMPRESSIONS   Medical Diagnosis:      Treatment Diagnosis: safe functional oropharyngeal swallow   Impression/Assessment: Pt is a 88 year old male with dysphagia complaints. The following significant findings have been identified: impaired swallowing, characterized by dry oral mucosa causing decreased bolus  transit through oral and pharyngeal phases. No overt clinical s/sx of aspiration/penetration noted. Pt demonstrates adequate ROM and strength of oral mechanism. He has slight weakness in lingual function but adequate for bolus manipulation.      PLAN OF CARE  Evaluation only    Recommended Referrals to Other Professionals: none  Education Assessment:   Learner/Method: Patient;No Barriers to Learning    Risks and benefits of evaluation/treatment have been explained.   Patient/Family/caregiver agrees with Plan of Care.     Evaluation Time:    SLP Eval: oral/pharyngeal swallow function, clinical minutes (67674): 15     Present: Not applicable     Signing Clinician: Radha Leigh, ALINA

## 2024-02-22 NOTE — NURSING NOTE
No chief complaint on file.      Vitals were taken and medications were reconciled.    Kyle Martinez, Technician  11:01 AM  February 22, 2024

## 2024-02-22 NOTE — LETTER
2/22/2024       RE: Paul Allen Franke  [address] [facility] [address]       Dear Colleague,    Thank you for referring your patient, Paul Allen Franke, to the [facility] [place] at [facility]. Please see a copy of my visit note below.    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  Compared to last year, both legs are weaker and he is falling a bit more.  He has a walker, but I am not sure he is using it at all times inside the house.  He does not have a wheelchair, and he has declined it in the past.  He believes he can still brush his teeth, comb his hair, bring food to his mouth, type, hold a bag of groceries, and open bottles.  He does not complain much about his upper extremities today.  He does not have prominent pain, muscle cramps, or spasms.  His dysphagia is a little worse but he does not cut his food into smaller pieces, nor does he try to eat softer food.  He mostly notes problems with solids, and occasionally with liquids with infrequent episodes of liquid choking.  His voice is stably abnormal but still intelligible.  He denies major sialorrhea.  He has some issues with thick secretions at times, for which he uses as needed Mucinex. He denies dyspnea on exertion or orthopnea.    He had an EKG last year that was reassuring.  Vitamin D levels were very low and I had offered him 50,000 units weekly, and a recommendation to continue 2000 units daily after the weekly treatment.  He did not do the latter unfortunately.  He has not had a bone density scan.    /74 (BP Location: Left arm, Patient Position: Sitting, Cuff Size: Adult Regular)   Pulse 75   Wt 73.5 kg (162 lb)    SpO2 95%   BMI 24.81 kg/m      Neuro exam was not repeated.    In summary, Mr Franke has slowly progressive Be's disease.  We discussed a number of practical issues today.  He needs to see our physical therapist again.  I encouraged him to use his walker at all times, and I offered him a wheelchair for longer distances but he is not sure about it.  He should also see our speech therapist.  I would reinforce simple measures such as cutting his food into smaller pieces, blending his food, using liquids, chewing slowly and carefully, and sometimes using a chin tuck.  His weight is stable, and given the very slow progression of dysphagia in Kennedys disease I am not excited about a gastrostomy placement in his case.  He does not have any major respiratory concerns.      There is still no disease modifying drug available for Be's disease.    Patients with this condition require regular surveillance for additional systemic manifestations.  Bone health is important.  He should get a repeat vitamin D level today and he was again encouraged to start over-the-counter replacement of 2000 units of D3 daily.  I will also get a bone density scan for him for osteoporosis, and if it is abnormal, he may need weekly bisphosphonate.  It is important to maintain good bone health, because he is at increased risk of falling.  He should also get a repeat hemoglobin A1c, and an EKG, because recently it was found that cardiac arrhythmias and especially Brugada syndrome,  can occur in Kennedys disease with higher frequency than the general population.    Follow-up in 1 year or sooner if needed.    Billing is MDM level 4 (moderate) based on: #1 problems assessed: One chronic disorder with progression, exacerbation, or side effects of treatment (Be's disease), and #2 Amount/complexity: Ordering 3 or more unique lab tests today (EKG, A1c, vitamin D levels, and bone density scan).      The longitudinal plan of care for the  diagnosis(es)/condition(s) as documented were addressed during this visit. Due to the added complexity in care, I will continue to support Kuldeep in the subsequent management and with ongoing continuity of care: Be's disease          Again, thank you for allowing me to participate in the care of your patient.      Sincerely,    Alban Gonzalez MD

## 2024-02-22 NOTE — PROGRESS NOTES
"[facility]     OUTPATIENT PHYSICAL THERAPY CLINIC NOTE  Franke,Paul Allen                       YOB: 1935  [id number]     Type of visit:                                                                              Evaluation            Date of service: 2/22/2024     Referring provider: Dr. Gonzalez     Others present at visit:  Daughter     Medical diagnosis:   Be's disease     Date of diagnosis: 1994     Pertinent history of current problem (include personal factors and/or comorbidities that impact the plan of care): Gradual BLE weakness over many years (40+).     Cardio-respiratory status:  Forced vital capacity: NT this date     Height/Weight: 5'6\" / 162 lb     Living environment:  Apartment, with daughter     Living environment barriers:  Elevator access in building      Current assistance/living environment:  Lives with daughter (she doesn't work, on disability)      Current mobility equipment:  Standard cane(s)- does not use  WW- primary mode of mobility      Current ADL equipment:  Tub/shower combo  Shower chair  Wall grab bar- suction cup     Technology used: NT    Patient concerns/goals: 3 falls in the last year, using walker almost 100% of the time. Not working anymore. Able to function independently, but needing to concentrate more now for safety    Evaluation   Interview completed.   Pain assessment:  Pain denied     Range of motion: NT   Manual muscle testing: Bilateral hip flexion 3+/5, bilateral knee extension 3/5, bilateral knee flexion 4/5, bilateral ankle dorsiflexion 4/5   Gait: Patient ambulates with 2 wheeled walker modified independence-reduced kathy, decreased stride length, flexed forward posture, mild L Trendelenburg pattern   Cognition: Intact   10MWT: 0.63 m/s    Fall Risk Screen:   Has the patient fallen 2 or more times in the last year? Yes      Has the " patient fallen and had an injury in the past year? Yes       Timed Up and Go Score: >13.5 seconds    Is the patient a fall risk? Yes, department fall risk interventions implemented     Impairments:  Fatigue  Muscle atrophy  Coordination  Balance  Range of motion     Treatment diagnosis:  Impaired mobility  Impaired activities of daily living    Clinical Presentation: Evolving/Changing  Clinical Presentation Rationale: Personal factors, body systems involved, progressive nature  Clinical Decision Making (Complexity): Moderate complexity     Recommendations/Plan of care:  1 session evaluation & treatment.     Goals:   Target date: 2/22/2024  Patient, family and/or caregiver will verbalize understanding of evaluation results and implications for functional performance.  Patient, family and/or caregiver will verbalize/demonstrate understanding of compensatory methods /equipment to enhance functional independence and safety.  Patient, family and/or caregiver will verbalize energy management techniques appropriate for status and setting.    Educational assessment/barriers to learning:   No barriers noted     Treatment provided this date:   ADL/self-care management-10 minutes  -Educated patient on energy conservation techniques including pacing of activities, frequent rest breaks, use of assistive device and monitoring signs of fatigue (increased muscle soreness, weakness, cramps, fasciculations) for safe functional mobility and performance of daily tasks  -Ongoing collaboration on recommendation to use walker at all times, even for very short distances due to B quad weakness and sudden buckling leading to falls. Patient denies a fall that occurred with use of WW; ongoing recommendation to us. Recommended purchase of transport wheelchair that he could use for outdoor mobility or uneven surfaces to decrease fall risk and improve activity participation.    Response to treatment/recommendations: Patient verbalizes  understanding and agreement    Goal attainment:  All goals met     Risks and benefits of evaluation/treatment have been explained.  Patient, family and/or caregiver are in agreement with Plan of Care.     Timed Code Treatment Minutes: 10  Total Treatment Time (sum of timed and untimed services): 18    Signature: Adilia Langley PT   Date: 2/22/2024        [facility]                                                                                   OUTPATIENT PHYSICAL THERAPY    PLAN OF TREATMENT FOR OUTPATIENT REHABILITATION   Patient's Last Name, First Name, M.I. Franke,Paul Allen YOB: 1935   Provider's Name   [facility]   Medical Record No.  [id number]     Onset Date: 01/01/94  Start of Care Date: 02/22/24     Medical Diagnosis:  Be's Disease      PT Treatment Diagnosis:  IMpaired force production Plan of Treatment  Frequency/Duration: 1 time/ 1 day    Certification date from 02/22/24 to 02/22/24         See note for plan of treatment details and functional goals     Adilia Langley PT                         I CERTIFY THE NEED FOR THESE SERVICES FURNISHED UNDER        THIS PLAN OF TREATMENT AND WHILE UNDER MY CARE     (Physician attestation of this document indicates review and certification of the therapy plan).              Referring Provider:  Dr. Gonzalez    Initial Assessment  See Epic Evaluation- Start of Care Date: 02/22/24

## 2024-02-22 NOTE — PATIENT INSTRUCTIONS
Follow up 1 year    We will get an EKG today, re-check of your vitamin D level in the blood, and a repeat blood check for diabetes.    I would like you to get a bone density scan for osteoporosis. Please call [phone] to schedule.    Recommend taking over-the-counter vitamin D3 2000 units daily.    You will see our physical therapist and speech therapist today.

## 2024-02-22 NOTE — PROGRESS NOTES
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  Compared to last year, both legs are weaker and he is falling a bit more.  He has a walker, but I am not sure he is using it at all times inside the house.  He does not have a wheelchair, and he has declined it in the past.  He believes he can still brush his teeth, comb his hair, bring food to his mouth, type, hold a bag of groceries, and open bottles.  He does not complain much about his upper extremities today.  He does not have prominent pain, muscle cramps, or spasms.  His dysphagia is a little worse but he does not cut his food into smaller pieces, nor does he try to eat softer food.  He mostly notes problems with solids, and occasionally with liquids with infrequent episodes of liquid choking.  His voice is stably abnormal but still intelligible.  He denies major sialorrhea.  He has some issues with thick secretions at times, for which he uses as needed Mucinex. He denies dyspnea on exertion or orthopnea.    He had an EKG last year that was reassuring.  Vitamin D levels were very low and I had offered him 50,000 units weekly, and a recommendation to continue 2000 units daily after the weekly treatment.  He did not do the latter unfortunately.  He has not had a bone density scan.    /74 (BP Location: Left arm, Patient Position: Sitting, Cuff Size: Adult Regular)   Pulse 75   Wt 73.5 kg (162 lb)   SpO2 95%   BMI 24.81 kg/m      Neuro exam was not repeated.    In summary, Mr Franke has slowly progressive Be's disease.  We discussed a number of practical issues today.  He needs to see our physical therapist again.  I encouraged him to use his walker at all times, and I offered him a wheelchair for  longer distances but he is not sure about it.  He should also see our speech therapist.  I would reinforce simple measures such as cutting his food into smaller pieces, blending his food, using liquids, chewing slowly and carefully, and sometimes using a chin tuck.  His weight is stable, and given the very slow progression of dysphagia in Kennedys disease I am not excited about a gastrostomy placement in his case.  He does not have any major respiratory concerns.      There is still no disease modifying drug available for Be's disease.    Patients with this condition require regular surveillance for additional systemic manifestations.  Bone health is important.  He should get a repeat vitamin D level today and he was again encouraged to start over-the-counter replacement of 2000 units of D3 daily.  I will also get a bone density scan for him for osteoporosis, and if it is abnormal, he may need weekly bisphosphonate.  It is important to maintain good bone health, because he is at increased risk of falling.  He should also get a repeat hemoglobin A1c, and an EKG, because recently it was found that cardiac arrhythmias and especially Brugada syndrome,  can occur in Kennedys disease with higher frequency than the general population.    Follow-up in 1 year or sooner if needed.    Billing is MDM level 4 (moderate) based on: #1 problems assessed: One chronic disorder with progression, exacerbation, or side effects of treatment (Be's disease), and #2 Amount/complexity: Ordering 3 or more unique lab tests today (EKG, A1c, vitamin D levels, and bone density scan).    Sincerely,      Alban Gonzalez MD, FAAN  Clinical  of Neurology    The longitudinal plan of care for the diagnosis(es)/condition(s) as documented were addressed during this visit. Due to the added complexity in care, I will continue to support Kuldeep in the subsequent management and with ongoing continuity of care: Be's  disease

## 2024-02-23 DIAGNOSIS — G12.1 KENNEDY'S DISEASE (H): ICD-10-CM

## 2024-02-23 LAB
ATRIAL RATE - MUSE: 77 BPM
DIASTOLIC BLOOD PRESSURE - MUSE: NORMAL MMHG
INTERPRETATION ECG - MUSE: NORMAL
P AXIS - MUSE: 61 DEGREES
PR INTERVAL - MUSE: 158 MS
QRS DURATION - MUSE: 76 MS
QT - MUSE: 420 MS
QTC - MUSE: 475 MS
R AXIS - MUSE: 46 DEGREES
SYSTOLIC BLOOD PRESSURE - MUSE: NORMAL MMHG
T AXIS - MUSE: 50 DEGREES
VENTRICULAR RATE- MUSE: 77 BPM

## 2024-06-09 ENCOUNTER — HEALTH MAINTENANCE LETTER (OUTPATIENT)
Age: 89
End: 2024-06-09

## 2025-02-26 DIAGNOSIS — G12.1 KENNEDY'S DISEASE (H): Primary | ICD-10-CM

## 2025-02-27 ENCOUNTER — OFFICE VISIT (OUTPATIENT)
Dept: NEUROLOGY | Facility: CLINIC | Age: OVER 89
End: 2025-02-27
Payer: COMMERCIAL

## 2025-02-27 ENCOUNTER — THERAPY VISIT (OUTPATIENT)
Dept: PHYSICAL THERAPY | Facility: CLINIC | Age: OVER 89
End: 2025-02-27
Payer: COMMERCIAL

## 2025-02-27 ENCOUNTER — THERAPY VISIT (OUTPATIENT)
Dept: SPEECH THERAPY | Facility: CLINIC | Age: OVER 89
End: 2025-02-27
Payer: COMMERCIAL

## 2025-02-27 VITALS
SYSTOLIC BLOOD PRESSURE: 129 MMHG | HEART RATE: 67 BPM | RESPIRATION RATE: 14 BRPM | OXYGEN SATURATION: 97 % | BODY MASS INDEX: 22.81 KG/M2 | DIASTOLIC BLOOD PRESSURE: 73 MMHG | WEIGHT: 148.9 LBS

## 2025-02-27 DIAGNOSIS — R26.81 UNSTEADY GAIT: ICD-10-CM

## 2025-02-27 DIAGNOSIS — M81.0 AGE-RELATED OSTEOPOROSIS WITHOUT CURRENT PATHOLOGICAL FRACTURE: ICD-10-CM

## 2025-02-27 DIAGNOSIS — G12.1 KENNEDY'S DISEASE (H): Primary | ICD-10-CM

## 2025-02-27 DIAGNOSIS — G12.1 KENNEDY'S DISEASE (H): ICD-10-CM

## 2025-02-27 DIAGNOSIS — R13.12 OROPHARYNGEAL DYSPHAGIA: ICD-10-CM

## 2025-02-27 DIAGNOSIS — R47.1 DYSARTHRIA: ICD-10-CM

## 2025-02-27 LAB
EXPTIME-PRE: 3.88 SEC
FEF2575-%PRED-PRE: 167 %
FEF2575-PRE: 2.56 L/SEC
FEF2575-PRED: 1.52 L/SEC
FEFMAX-%PRED-PRE: 122 %
FEFMAX-PRE: 6.49 L/SEC
FEFMAX-PRED: 5.28 L/SEC
FEV1-%PRED-PRE: 91 %
FEV1-PRE: 2.03 L
FEV1FEV6-PRE: 85 %
FEV1FEV6-PRED: 75 %
FEV1FVC-PRE: 85 %
FEV1FVC-PRED: 75 %
FIFMAX-PRE: 2.76 L/SEC
FVC-%PRED-PRE: 79 %
FVC-PRE: 2.39 L
FVC-PRED: 2.99 L
MEP-PRE: 39 CMH2O
MIP-PRE: -41 CMH2O

## 2025-02-27 PROCEDURE — 3078F DIAST BP <80 MM HG: CPT | Performed by: PSYCHIATRY & NEUROLOGY

## 2025-02-27 PROCEDURE — 1126F AMNT PAIN NOTED NONE PRSNT: CPT | Performed by: PSYCHIATRY & NEUROLOGY

## 2025-02-27 PROCEDURE — 99214 OFFICE O/P EST MOD 30 MIN: CPT | Mod: GC | Performed by: PSYCHIATRY & NEUROLOGY

## 2025-02-27 PROCEDURE — G2211 COMPLEX E/M VISIT ADD ON: HCPCS | Performed by: PSYCHIATRY & NEUROLOGY

## 2025-02-27 PROCEDURE — 3074F SYST BP LT 130 MM HG: CPT | Performed by: PSYCHIATRY & NEUROLOGY

## 2025-02-27 RX ORDER — FLUTICASONE PROPIONATE 50 MCG
1 SPRAY, SUSPENSION (ML) NASAL DAILY
COMMUNITY
Start: 2024-11-15

## 2025-02-27 RX ORDER — ACETAMINOPHEN 325 MG/1
325 TABLET ORAL EVERY 4 HOURS PRN
COMMUNITY
Start: 2024-11-15

## 2025-02-27 RX ORDER — FOLIC ACID 1 MG/1
1 TABLET ORAL DAILY
COMMUNITY
Start: 2024-11-15

## 2025-02-27 RX ORDER — FINASTERIDE 5 MG/1
5 TABLET, FILM COATED ORAL DAILY
COMMUNITY
Start: 2025-01-15

## 2025-02-27 RX ORDER — TAMSULOSIN HYDROCHLORIDE 0.4 MG/1
1 CAPSULE ORAL DAILY
COMMUNITY
Start: 2024-11-15

## 2025-02-27 RX ORDER — POLYETHYLENE GLYCOL 3350 17 G/17G
1 POWDER, FOR SOLUTION ORAL PRN
COMMUNITY

## 2025-02-27 RX ORDER — POTASSIUM CHLORIDE 750 MG/1
1 TABLET, EXTENDED RELEASE ORAL DAILY
COMMUNITY
Start: 2024-11-15

## 2025-02-27 ASSESSMENT — PAIN SCALES - GENERAL: PAINLEVEL_OUTOF10: NO PAIN (0)

## 2025-02-27 NOTE — PROGRESS NOTES
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     Ambulation- uses a walker, had about 2 falls since last Fall.   ADLs- no issues brushing his teeth, combing his hair, feeding himself.   Dysphagia-has choking events about once per month. Chokes on solids and liquids, chokes on liquids about once week.   Dysarthria-no changes  Sialorrhea-denies  Secretions-denies  Dyspnea-mild dyspnea with exertion and talking for long periods of time. He says unchanged from last visit.   Orthopnea-denies  Vitamin D-is taking vitamin D prescribed last visit.       Current Medications:      Current Outpatient Medications   Medication Sig Dispense Refill    aspirin 81 MG tablet Take 1 tablet by mouth daily.      bisacodyl (DULCOLAX) 10 MG suppository Place 10 mg rectally daily as needed for constipation      bisacodyl (DULCOLAX) 5 MG EC tablet Take 5 mg by mouth daily as needed for constipation      clobetasol (TEMOVATE) 0.05 % ointment Apply 1 Application topically daily (Patient not taking: Reported on 8/9/2021)      diflorasone (PSORCON) 0.05 % ointment Apply to affected area(s) twice daily (Patient not taking: Reported on 8/9/2021) 180 g 0    furosemide (LASIX) 40 MG tablet Take 40 mg by mouth      gabapentin (NEURONTIN) 100 MG capsule Take 2 capsules (200 mg) by mouth 3 times daily (Patient not taking: Reported on 1/13/2022) 180 capsule 5    lisinopril (PRINIVIL,ZESTRIL) 2.5 MG tablet Take 2.5 mg by mouth daily. (Patient not taking: Reported on 2/22/2024)      LORazepam (ATIVAN) 0.5 MG tablet Take 1 tablet (0.5 mg) by mouth See Admin Instructions (Patient not taking: Reported on 8/9/2021) 3 tablet 0    lovastatin  (MEVACOR) 40 MG tablet Take 2 tablets by mouth At Bedtime.      metoprolol succinate ER (TOPROL XL) 50 MG 24 hr tablet Take 50 mg by mouth daily.      metoprolol tartrate (LOPRESSOR) 25 MG tablet metoprolol tartrate 25 mg tablet   1 tablet 2x perday (Patient not taking: Reported on 2/22/2024)      nitroGLYCERIN (NITROSTAT) 0.4 MG SL tablet Place 0.4 mg under the tongue every 5 minutes as needed (Patient not taking: Reported on 2/22/2024)      oxybutynin (DITROPAN) 5 MG tablet Take 5 mg by mouth 2 times daily      oxyCODONE (ROXICODONE) 5 MG tablet Take 5 mg by mouth as needed Pt taking 0.5tab (Patient not taking: Reported on 1/12/2023)      sulfamethoxazole-trimethoprim (BACTRIM DS) 800-160 MG tablet every 12 hours (Patient not taking: Reported on 8/9/2021)      triamcinolone (KENALOG) 0.1 % ointment Apply 1 Application topically daily  (Patient not taking: Reported on 2/22/2024)      vitamin D3 (CHOLECALCIFEROL) 1.25 MG (59794 UT) capsule Take 1 capsule (50,000 Units) by mouth every 7 days 8 capsule 0     No current facility-administered medications for this visit.     Facility-Administered Medications Ordered in Other Visits   Medication Dose Route Frequency Provider Last Rate Last Admin    gadobutrol (GADAVIST) injection 7.5 mL  7.5 mL Intravenous Once Alban Gonzalez MD           Review of Systems:   A complete review of systems was obtained and was negative except for what was noted above.    Physical examination:    /73 (BP Location: Right arm, Patient Position: Sitting, Cuff Size: Adult Regular)   Pulse 67   Resp 14   Wt 67.5 kg (148 lb 14.4 oz)   SpO2 97%   BMI 22.81 kg/m      Brief neurologic examination:         Motor exam:    Right Left   Neck flexion 5    Neck extension: 5    Shoulder abduction:  4+ 4-   Elbow Flexion: -5 4+   Elbow Extension:  4+ 4   Wrist Extension:  5 5   Finger Extension:  5 5   FDI 4+ 4+   APB 4 4   Finger Flexion 5 5   Wrist Flexion 5 5   Hip Flexion 4 4         Knee Extension 4+ 4+   Knee Flexion 4+ 4+   Dorsiflexion 5 5   Plantar flexion 5 5   Foot Inversion 5 5   Foot Eversion 5 5          Complex motor skills revealed normal coordination.  Finger-nose- finger and heel to shin were intact.           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  He has had a very slow progressive course. He is currently dependent on a walker, but is having falls. He chokes on liquids frequently, but swallows solids well. He also reports mild dyspnea on exertion. He also has weakness in his upper extremities. Today we discussed the need to plan ahead for potential problems as the disease slowly progresses. We advised him to use a wheelchair for traveling longer distances. He also discussed the indications for G-tube placement and Bipap, should he require these in the future. Currently his dysaphia is not severe enough to warrant a G-tube. Plan to check PFTs      Plan:      - see PT, Speech therapy  - PFTs ordered  - prescribe powerwheel chair      Seen with Dr. Gonzalez,    Chilo Martino, DO   Neurophysiology Fellow

## 2025-02-27 NOTE — PATIENT INSTRUCTIONS
I would like to check your breathing function today and you will see our speech therapist and our physical therapist.    I do believe you would benefit from a power wheelchair in the long-term.      Follow-up in 6 months

## 2025-02-27 NOTE — PROGRESS NOTES
"[facility]     OUTPATIENT PHYSICAL THERAPY CLINIC NOTE  Franke,Paul Allen                       YOB: 1935  [id number]     Type of visit:                                                                              Evaluation            Date of service: 2/27/2025     Referring provider: Dr. Gonzalez     Others present at visit:  Daughter     Medical diagnosis:   Be's disease     Date of diagnosis: 1994     Pertinent history of current problem (include personal factors and/or comorbidities that impact the plan of care): Gradual BLE weakness over many years (40+).     Cardio-respiratory status:  Forced vital capacity: FVC-% pred-pre: 79%      Height/Weight: 5'6\" / 148 lb     Living environment:  Apartment, with daughter     Living environment barriers:  Elevator access in building      Current assistance/living environment:  Lives with daughter (she doesn't work, on disability)      Current mobility equipment:  Standard cane(s)- does not use  WW- primary mode of mobility      Current ADL equipment:   Tub/shower combo  Shower chair  Wall grab bar- suction cup     Technology used: NT    Patient concerns/goals: Reports no significant changes in strength and mobility. Feels that his LE edema is what makes his walking the hardest. He does not feel he needs a w/c. He walks when he needs to, and if it is too much to walk, he changes/adjusts his plan. Independent with ADLs. Has assistance with cooking meals. Does still drive occasionally. He had 2 falls in the past year, does not think they regularly occur when using the walker, but unsure of any contributing factors/patterns.     Evaluation   Interview completed.   Pain assessment:  Pain denied     Range of motion: NT   Manual muscle testing: Bilateral hip flexion 3+/5, bilateral knee extension 3+/5, bilateral knee flexion 4/5, bilateral ankle " dorsiflexion 4/5   Gait: Patient ambulates with 2 wheeled walker modified Drexel-reduced kathy, decreased stride length, flexed forward posture, mild L Trendelenburg pattern   Cognition: Intact   10MWT: 9 sec with walker    Fall Risk Screen:   Has the patient fallen 2 or more times in the last year? Yes      Has the patient fallen and had an injury in the past year? Yes       Timed Up and Go Score: >13.5 seconds    Is the patient a fall risk? Yes, department fall risk interventions implemented     Impairments:  Fatigue  Muscle atrophy  Coordination  Balance  Range of motion     Treatment diagnosis:  Impaired mobility  Impaired activities of daily living    Clinical Presentation: Evolving/Changing  Clinical Presentation Rationale: Personal factors, body systems involved, progressive nature  Clinical Decision Making (Complexity): Moderate complexity     Recommendations/Plan of care:  1 session evaluation & treatment.     Goals:   Target date: 2/27/2025  Patient, family and/or caregiver will verbalize understanding of evaluation results and implications for functional performance.  Patient, family and/or caregiver will verbalize/demonstrate understanding of compensatory methods /equipment to enhance functional independence and safety.  Patient, family and/or caregiver will verbalize/demonstrate understanding of home program.  Patient, family and/or caregiver will verbalize/demonstrate understanding of positioning techniques/equipment.  Patient, family and/or caregiver will verbalize energy management techniques appropriate for status and setting.    Educational assessment/barriers to learning:   No barriers noted     Educational assessment/barriers to learning:   No barriers noted     Treatment provided this date:   ADL/self-care management-8 minutes  -Reviewed fall prevention strategies, including the need to use the walker at all times for mobility. Reports that he has not been sitting down to dress himself.  Recommended always sitting down, either on the bed, or having a chair in his room, to sit down while dressing. He also reported not always using a shower chair. Recommended that in order to take all measures for preventing falls, he should be using shower chair.   -Educated patient on the benefits of a power wheelchair, which he was not interested in. Did also discuss transport w/c to reduce fall risk and improve accessibility to the community. Patient was not certain about wanting a w/c, but his daughter was interested in having a w/c.     Response to treatment/recommendations: Patient/caregiver verbalizes understanding of all information.    Goal attainment:  All goals met     Risks and benefits of evaluation/treatment have been explained.  Patient, family and/or caregiver are in agreement with Plan of Care.     Timed Code Treatment Minutes: 8  Total Treatment Time (sum of timed and untimed services): 32    Signature: Raquel Junior PT   Date: 2/27/2025        [facility]                                                                                   OUTPATIENT PHYSICAL THERAPY    PLAN OF TREATMENT FOR OUTPATIENT REHABILITATION   Patient's Last Name, First Name, M.I. Franke,Paul Allen YOB: 1935   Provider's Name   [facility]   Medical Record No.  [id number]     Onset Date:   2/27/2025 Start of Care Date:   2/27/2025     Medical Diagnosis:    ALS      PT Treatment Diagnosis:    Force Production Deficit Plan of Treatment  Frequency/Duration:  1 visit/1 day      Certification date from  2/27/2025 to 2/27/2025           See note for plan of treatment details and functional goals     Raquel Junior PT                         I CERTIFY THE NEED FOR THESE SERVICES FURNISHED UNDER        THIS PLAN OF TREATMENT AND WHILE UNDER MY CARE     (Physician attestation of this document indicates review and certification of the therapy plan).               Referring Provider:  Dr. Gonzalez    Initial Assessment  See Epic Evaluation-

## 2025-02-27 NOTE — PATIENT INSTRUCTIONS
I would recommend using the shower chair to prevent falls  I would recommend using your walker for all walking inside and out.   I would recommend sitting down while you are getting dressed to prevent falls.   We talked about wheelchairs today. A transport wheelchair may be a good option for longer distances. It is fairly lightweight and can be folded up to put in the car.   There are many options online. Here are a couple from [employer].     https://a.co/d/18ou9wJ  https://a.co/d/aCcjR13    Lurdes Junior DPT  Physical Therapist  [facility] [address]  [address] [place] [address]    Appointments: [phone]          
Awake/Alert/Cooperative

## 2025-02-27 NOTE — PATIENT INSTRUCTIONS
SWALLOWING RECOMMENDATIONS    Diet Recommendations: avoid hard dry foods and choose soft moist solids. Okay for thin liquids when using strategies below.    Eating Strategies: small bites/sips, slow rate of intake, tuck chin down when swallowing, mindful swallowing, use caution with straws, alternate food and liquid not both at the same time (take a bite of food, swallow it, then take a sip of liquid)    Oral Hygiene: continue regular oral cares including brushing, flossing, and mouthwash to avoid aspiration of bacteria      SPEECH/COMMUNICATION RECOMMENDATIONS    Speech Strategies: Reduce background noise, face communication partner, speak slowly and over-enunciate each word. Tell others it is okay to ask for repetition    Augmentative/Alternative Communication: Consider using alternative forms of communication such as writing, gestures, etc. when you are not understood

## 2025-02-27 NOTE — NURSING NOTE
Chief Complaint   Patient presents with    Als     Return ALS/Motor Neuron      /73 (BP Location: Right arm, Patient Position: Sitting, Cuff Size: Adult Regular)   Pulse 67   Resp 14   Wt 67.5 kg (148 lb 14.4 oz)   SpO2 97%   BMI 22.81 kg/m    Starr Paez

## 2025-02-27 NOTE — PROGRESS NOTES
SPEECH LANGUAGE PATHOLOGY EVALUATION    See electronic medical record for Abuse and Falls Screening details.    Subjective   Presenting condition or subjective complaint: Be's Disease  Date of onset: diagnosed many years ago  Relevant medical history: Refer to medical record  Prior therapy history for the same diagnosis, illness or injury: patient has been followed in this [facility] on average every 3 months, last seen by SLP 2/22/24  Others at Clinic Visit and/or support at home: daughter Sheila with him today  Patient goals for therapy/concerns: Increased speech deficits, Increased swallowing deficits      Objective   Cardio-Respiratory Status: Forced Vital Capacity 79% of predicted  Height/Weight: 5'7 and 148lbs  Functional Rating Scale (ALS-FRS): N/A  10 Meter Walk test: defer to PT  Speaking Rate: 168 wpm    Oral Motor/Swallowing  Current Diet/Method of Nutritional Intake: thin liquids (level 0), regular diet      Oral Motor Function:   Anomalies present:    Dentition: natural dentition, adequate dentition  Oral Hygiene: intact  Secretion management: WFL  Mandibular function: intact  Oral labial function:  ROM (mild), Strength (mild-moderate), and Rate (intact), right side greater than left  Lingual function:  ROM (intact), Strength (mild), and Rate (intact)  Velar function:  hypernasal voice  Buccal function: reduced expansion  Laryngeal function: cough and throat clear intact    Textures Trialed:   Clinical Swallow Eval: Thin Liquids  Mode of presentation: spoon, self-fed   Volume presented: 3oz  Preparatory Phase: impaired labial seal  Oral Phase: impaired AP movement  Pharyngeal phase of swallow: reduction in laryngeal movement, repeated swallows   Diagnostic statement: mildly impaired swallow function without overt signs of aspiration    Clinical Swallow Eval: Solids  Mode of presentation: self-fed   Volume presented: 1 Loni tyson  Preparatory Phase: WFL  Oral Phase:  impaired AP movement  Pharyngeal phase of swallow: reduction in laryngeal movement, repeated swallows   Diagnostic statement: mild deficits     Dysphagia Diagnosis: Mild dysphagia  Diet Texture Recommendations: thin liquids (level 0), soft & bite-sized (level 6), easy to chew (level 7)  Recommended Feeding/Eating Techniques: see description below  Medication Administration Recommendations: continue whole with liquid wash (he denies difficulty)  Instrumental Assessment Recommendations:  Educated in possible benefit to more clearly assess dysphagia and aspiration risks but he politely declined and did not feel his dysphagia was significant enough to warrant further assessment    Dysphagia Intervention: SLP educated in swallowing anatomy and physiology (with visuals) including aspiration and possible respiratory compromise from aspiration. Trained safe swallow strategies to reduce aspiration risks (small bites/sips, slow rate of intake, chin tuck, mindful swallowing, use caution with straws, alternate consistencies- finishing solids prior to taking liquids). Also trained diet modifications to reduce risk of aspiration and ease intake (avoid hard dry foods with particles and choosing soft moist solids, pureed solids, avoiding mixed consistencies, thickened liquids)      Speech Intelligibility/Functional Communication   Methods of communication: Verbal    Speech Intelligibility:  100% intelligible to this SLP however daughter reports 60%, patient reports needing to repeat himself only rarely  Respiration Observations: WNL   Phonation: rough voice   Resonance: hypernasal  Rate/prosody: WNL   Articulation: imprecise articulation   Speaking Rate: 168 Words per minute per reading of [place] (norm 150-250wpm)  Dysarthria differential diagnosis: Flaccid/LMN   Motor speech level of impairment: moderate impairment     Communication Intervention: SLP trained use of speech strategies to improve intelligibility and reduce  fatigue with speaking (reducing background noise, facing the conversation partner, speaking slowly, exaggerating each sound). SLP also trained conversational partners present today regarding listener strategies (giving speaker full attention, letting the speaker know if message not understood).      Assessment & Plan   Clinical Impressions  Medical Diagnosis: ALS  Treatment Diagnosis:  Dysarthria, Dysphagia  Impression/Assessment: Pt is a 89 year old male with Be's disease. The following significant findings have been identified: impaired speech intelligibility and impaired swallowing, characterized by mild dysphagia and moderate dysarthria. Identified deficits interfere with their ability to communicate within the home or community and consume an oral diet as compared to previous level of function.    Plan of Care  Treatment Interventions:  Swallowing dysfunction and/or oral function for feeding, Speech    Prognosis to achieve stated therapy goals is good   Rehab potential is impacted by: comorbidities    Long Term Goals: Based on today's evaluation session patient and/or caregiver will have understanding of current communication and/or swallowing status and recommendations for management.  Frequency of Treatment: one time only eval and treat within Interdisciplinary Clinic  Duration of Treatment: one time only eval and treat within Interdisciplinary Clinic  Recommended Referrals to Other Professionals: None     Risks and benefits of evaluation/treatment have been explained.   Patient/Family/caregiver agrees with Plan of Care.     Recommendations:  Oral diet of soft/moist solids and thin liquids  Use of safe swallow strategies listed above  Use of speech strategies listed above    Education provided/response: Speech  Swallowing  Verbalized understanding    Treatment provided this date:   Swallow intervention, 18 minutes (see above for details)  Communication intervention, 5 minutes (see above for  details)    Response to recommendations/treatment: verbalized understanding, asked appropriate questions, agreed to recommendations    Goal attainment: All goals met    Total Evaluation Time (minutes): 20  Timed Code Treatment (minutes): 0 minutes  Total Treatment Time (sum of timed and untimed services): 23     Present: Not applicable     Signing Clinician: Xuan See, SLP              [facility]                                                                                   OUTPATIENT SPEECH LANGUAGE PATHOLOGY      PLAN OF TREATMENT FOR OUTPATIENT REHABILITATION   Patient's Last Name, First Name, M.I. Franke,Paul Allen YOB: 1935   Provider's Name   [facility]   Medical Record No.  [id number]     Onset Date:  2/27/25 Start of Care Date:  2/27/25     Medical Diagnosis:   Be's Disease      SLP Treatment Diagnosis:   Dysphagia Plan of Treatment  Frequency/Duration:  1 / 1    Certification date from  2/27/25   To     2/27/25       See note for plan of treatment details and functional goals     Xuan See, SLP                         I CERTIFY THE NEED FOR THESE SERVICES FURNISHED UNDER        THIS PLAN OF TREATMENT AND WHILE UNDER MY CARE     (Physician attestation of this document indicates review and certification of the therapy plan).              Referring Provider:  Referred Self    Initial Assessment  See Epic Evaluation-

## 2025-02-27 NOTE — LETTER
2/27/2025       RE: Paul Allen Franke  [address] [address]     Dear Colleague,    Thank you for referring your patient, Paul Allen Franke, to the [facility] NEUROLOGY CLINIC [place] [facility]. Please see a copy of my visit note below.    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     Ambulation- uses a walker, had about 2 falls since last Fall.   ADLs- no issues brushing his teeth, combing his hair, feeding himself.   Dysphagia-has choking events about once per month. Chokes on solids and liquids, chokes on liquids about once week.   Dysarthria-no changes  Sialorrhea-denies  Secretions-denies  Dyspnea-mild dyspnea with exertion and talking for long periods of time. He says unchanged from last visit.   Orthopnea-denies  Vitamin D-is taking vitamin D prescribed last visit.       Current Medications:      Current Outpatient Medications   Medication Sig Dispense Refill     aspirin 81 MG tablet Take 1 tablet by mouth daily.       bisacodyl (DULCOLAX) 10 MG suppository Place 10 mg rectally daily as needed for constipation       bisacodyl (DULCOLAX) 5 MG EC tablet Take 5 mg by mouth daily as needed for constipation       clobetasol (TEMOVATE) 0.05 % ointment Apply 1 Application topically daily (Patient not taking: Reported on 8/9/2021)       diflorasone (PSORCON) 0.05 % ointment Apply to affected area(s) twice daily (Patient not taking: Reported on 8/9/2021) 180 g 0     furosemide (LASIX) 40 MG tablet Take 40 mg by mouth       gabapentin (NEURONTIN) 100 MG capsule Take 2 capsules (200 mg) by mouth 3 times daily (Patient not taking:  Reported on 1/13/2022) 180 capsule 5     lisinopril (PRINIVIL,ZESTRIL) 2.5 MG tablet Take 2.5 mg by mouth daily. (Patient not taking: Reported on 2/22/2024)       LORazepam (ATIVAN) 0.5 MG tablet Take 1 tablet (0.5 mg) by mouth See Admin Instructions (Patient not taking: Reported on 8/9/2021) 3 tablet 0     lovastatin (MEVACOR) 40 MG tablet Take 2 tablets by mouth At Bedtime.       metoprolol succinate ER (TOPROL XL) 50 MG 24 hr tablet Take 50 mg by mouth daily.       metoprolol tartrate (LOPRESSOR) 25 MG tablet metoprolol tartrate 25 mg tablet   1 tablet 2x perday (Patient not taking: Reported on 2/22/2024)       nitroGLYCERIN (NITROSTAT) 0.4 MG SL tablet Place 0.4 mg under the tongue every 5 minutes as needed (Patient not taking: Reported on 2/22/2024)       oxybutynin (DITROPAN) 5 MG tablet Take 5 mg by mouth 2 times daily       oxyCODONE (ROXICODONE) 5 MG tablet Take 5 mg by mouth as needed Pt taking 0.5tab (Patient not taking: Reported on 1/12/2023)       sulfamethoxazole-trimethoprim (BACTRIM DS) 800-160 MG tablet every 12 hours (Patient not taking: Reported on 8/9/2021)       triamcinolone (KENALOG) 0.1 % ointment Apply 1 Application topically daily  (Patient not taking: Reported on 2/22/2024)       vitamin D3 (CHOLECALCIFEROL) 1.25 MG (50016 UT) capsule Take 1 capsule (50,000 Units) by mouth every 7 days 8 capsule 0     No current facility-administered medications for this visit.     Facility-Administered Medications Ordered in Other Visits   Medication Dose Route Frequency Provider Last Rate Last Admin     gadobutrol (GADAVIST) injection 7.5 mL  7.5 mL Intravenous Once Alban Gonzalez MD           Review of Systems:   A complete review of systems was obtained and was negative except for what was noted above.    Physical examination:    /73 (BP Location: Right arm, Patient Position: Sitting, Cuff Size: Adult Regular)   Pulse 67   Resp 14   Wt 67.5 kg (148 lb 14.4 oz)   SpO2 97%    BMI 22.81 kg/m      Brief neurologic examination:         Motor exam:    Right Left   Neck flexion 5    Neck extension: 5    Shoulder abduction:  4+ 4-   Elbow Flexion: -5 4+   Elbow Extension:  4+ 4   Wrist Extension:  5 5   Finger Extension:  5 5   FDI 4+ 4+   APB 4 4   Finger Flexion 5 5   Wrist Flexion 5 5   Hip Flexion 4 4        Knee Extension 4+ 4+   Knee Flexion 4+ 4+   Dorsiflexion 5 5   Plantar flexion 5 5   Foot Inversion 5 5   Foot Eversion 5 5          Complex motor skills revealed normal coordination.  Finger-nose- finger and heel to shin were intact.           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  He has had a very slow progressive course. He is currently dependent on a walker, but is having falls. He chokes on liquids frequently, but swallows solids well. He also reports mild dyspnea on exertion. He also has weakness in his upper extremities. Today we discussed the need to plan ahead for potential problems as the disease slowly progresses. We advised him to use a wheelchair for traveling longer distances. He also discussed the indications for G-tube placement and Bipap, should he require these in the future. Currently his dysaphia is not severe enough to warrant a G-tube. Plan to check PFTs      Plan:      - see PT, Speech therapy  - PFTs ordered  - prescribe powerwheel chair      Seen with Dr. Gonzalez,    Chilo Martino, DO   Neurophysiology Fellow        Attestation signed by Alban Gonzalez MD at 3/1/2025 10:17 AM:  Patient seen and examined with Neurophysiology Fellow Dr Martino at the [facility] on 2/27/2025. I agree with his assessment and plan. Mr Franke is experiencing a gradual decline in his UE and LE strength and mobility, and  swallowing function, as expected with longstanding Be disease. He understands we do not have any disease modifying treatments for his condition, and furthermore I would be a bit skeptical to advocate for very aggressive life-prolonging interventions in an 89 year old man with this condition, but I do believe he needs reassessment by our PT and SLP therapists. He will likely need some diet modifications for his increasing dysphagia. I asked him whether he would be ok with a gastrostomy placement if this were medically indicated in the future. He told me this would be the last resort.     I believe that this patient requires a power wheelchair to allow activities of daily living to be performed in his house. This necessity is due to his progressive leg weakness due to an incurable neuromuscular disease (Be disease). He does have the cognitive and manual skills required to safely operate a PWC.     In addition, it has been realized over the past decade that Be's is a multisystem disease, and patients often develop endocrinopathies in addition to male hormone dysfunction, abnormal bone density, cardiac arrhythmias like Brugada syndrome, etc. Therefore, screening for those complications is required. I am ordering again an EKG and a bone density scan (not done last year). Patient was vitamin D deficient last year, and we gave him high dose weekly therapy (50,000 units) for 8 weeks, and he continued oral vitamin D3 at 2000 international unit(s) thereafter. We will recheck levels today and we should also check vitamin B12 as his levels last year were very borderline (192) and low B12 in elderly can increase cognitive dysfunction and cause unsteady gait or falling due to sensory neuropathy.    I would like him to return to Clinic in 6 months, sooner if needed.    Billing MDM level 4 (moderate) based on 1) Problems assessed: One chronic disorder with progression, exacerbation, or side effects of treatment  (Be disease) and 2)Amount/complexity: Ordering 3 or more unique tests today (EKG, vitamin D levels, vitamin B12, DXA bone density).    The longitudinal plan of care for the diagnosis(es)/condition(s) as documented were addressed during this visit. Due to the added complexity in care, I will continue to support Kuldeep in the subsequent management and with ongoing continuity of care.        Again, thank you for allowing me to participate in the care of your patient.      Sincerely,    Alban Gonzalez MD

## 2025-03-03 ENCOUNTER — TELEPHONE (OUTPATIENT)
Dept: NEUROLOGY | Facility: CLINIC | Age: OVER 89
End: 2025-03-03
Payer: COMMERCIAL

## 2025-03-03 NOTE — TELEPHONE ENCOUNTER
Sent [employer] (1st Attempt) for the patient to call back and schedule the following:    Appointment type: Tests  Provider: Per Dr. Gonzalez  Return date: next avail  Specialty phone number: [phone]  Additional appointment(s) needed: See Below  Additonal Notes:     Please assist the pt with scheduling a DEXA bone scan, a EKG and Labs all on the same day per the provider.

## 2025-06-15 ENCOUNTER — HEALTH MAINTENANCE LETTER (OUTPATIENT)
Age: OVER 89
End: 2025-06-15

## 2025-08-12 DIAGNOSIS — G12.1 KENNEDY'S DISEASE (H): Primary | ICD-10-CM

## 2025-08-14 ENCOUNTER — OFFICE VISIT (OUTPATIENT)
Dept: NEUROLOGY | Facility: CLINIC | Age: OVER 89
End: 2025-08-14
Payer: COMMERCIAL

## 2025-08-14 VITALS
BODY MASS INDEX: 21.44 KG/M2 | SYSTOLIC BLOOD PRESSURE: 137 MMHG | OXYGEN SATURATION: 98 % | HEART RATE: 86 BPM | RESPIRATION RATE: 16 BRPM | WEIGHT: 140 LBS | DIASTOLIC BLOOD PRESSURE: 68 MMHG

## 2025-08-14 DIAGNOSIS — G12.1 KENNEDY'S DISEASE (H): Primary | ICD-10-CM

## 2025-08-14 ASSESSMENT — PAIN SCALES - GENERAL: PAINLEVEL_OUTOF10: NO PAIN (0)
